# Patient Record
Sex: MALE | Race: BLACK OR AFRICAN AMERICAN | Employment: UNEMPLOYED | URBAN - METROPOLITAN AREA
[De-identification: names, ages, dates, MRNs, and addresses within clinical notes are randomized per-mention and may not be internally consistent; named-entity substitution may affect disease eponyms.]

---

## 2018-11-07 ENCOUNTER — HOSPITAL ENCOUNTER (EMERGENCY)
Age: 38
Discharge: HOME | End: 2018-11-07
Attending: EMERGENCY MEDICINE
Payer: SELF-PAY

## 2018-11-07 VITALS
RESPIRATION RATE: 16 BRPM | SYSTOLIC BLOOD PRESSURE: 179 MMHG | BODY MASS INDEX: 18.02 KG/M2 | WEIGHT: 136 LBS | HEIGHT: 73 IN | TEMPERATURE: 97.8 F | HEART RATE: 91 BPM | DIASTOLIC BLOOD PRESSURE: 121 MMHG | OXYGEN SATURATION: 98 %

## 2018-11-07 DIAGNOSIS — S61.212A LACERATION OF RIGHT MIDDLE FINGER WITHOUT FOREIGN BODY WITHOUT DAMAGE TO NAIL, INITIAL ENCOUNTER: Primary | ICD-10-CM

## 2018-11-07 DIAGNOSIS — I10 HYPERTENSION, ESSENTIAL: ICD-10-CM

## 2018-11-07 PROCEDURE — 90715 TDAP VACCINE 7 YRS/> IM: CPT | Performed by: PHYSICIAN ASSISTANT

## 2018-11-07 PROCEDURE — 90471 IMMUNIZATION ADMIN: CPT

## 2018-11-07 PROCEDURE — 74011250636 HC RX REV CODE- 250/636: Performed by: PHYSICIAN ASSISTANT

## 2018-11-07 PROCEDURE — 99282 EMERGENCY DEPT VISIT SF MDM: CPT

## 2018-11-07 RX ADMIN — TETANUS TOXOID, REDUCED DIPHTHERIA TOXOID AND ACELLULAR PERTUSSIS VACCINE, ADSORBED 0.5 ML: 5; 2.5; 8; 8; 2.5 SUSPENSION INTRAMUSCULAR at 14:31

## 2018-11-07 NOTE — LETTER
[facility] EMERGENCY DEPT 
[address] [place] [address] 
[phone] Work/School Note Date: 11/7/2018 To Whom It May concern: 
 
Rodolfo Patton was seen and treated today in the emergency room by the following provider(s): 
Attending Provider: Maria De Jesus Watt MD 
Physician Assistant: Camila Hamlin. Rodolfo Patton may return to work on 11/8/18. Sincerely, ZULEYKA Sellers

## 2018-11-07 NOTE — ED TRIAGE NOTES
Pt. States \"I think I need stitches; I slid it across a light fixture 5 days ago\" observed healing laceration to middle finger on right hand.

## 2018-11-07 NOTE — DISCHARGE INSTRUCTIONS
Cuts Left Open: Care Instructions  Your Care Instructions    A cut can happen anywhere on your body. Sometimes a cut can injure the tendons, blood vessels, or nerves. A cut may be left open instead of being closed with stitches, staples, or adhesive. A cut may be left open when it is likely to become infected, because closing it can make infection even more likely. You will probably have a bandage. The doctor may want the cut to stay open the whole time it heals. This happens with some cuts when too much time has gone by since the cut happened. Or the doctor may tell you to come back to have the cut closed in 4 to 5 days, when there is less chance of infection. If the cut stays open while healing, your scar may be larger than if the cut was closed. But you can get treatment later to make the scar smaller. The doctor has checked you carefully, but problems can develop later. If you notice any problems or new symptoms, get medical treatment right away. Follow-up care is a key part of your treatment and safety. Be sure to make and go to all appointments, and call your doctor if you are having problems. It's also a good idea to know your test results and keep a list of the medicines you take. How can you care for yourself at home? · Keep the cut dry for the first 24 to 48 hours. After this, you can shower if your doctor okays it. Pat the cut dry. · Don't soak the cut, such as in a bathtub. Your doctor will tell you when it's safe to get the cut wet. · If your doctor told you how to care for your cut, follow your doctor's instructions. If you did not get instructions, follow this general advice:  ? After the first 24 to 48 hours, wash the cut with clean water 2 times a day. Don't use hydrogen peroxide or alcohol, which can slow healing. ? You may cover the cut with a thin layer of petroleum jelly, such as Vaseline, and a nonstick bandage. ?  Apply more petroleum jelly and replace the bandage as needed. · Prop up the injured area on a pillow anytime you sit or lie down during the next 3 days. Try to keep it above the level of your heart. This will help reduce swelling. · Avoid any activity that could cause your cut to get worse. · Take pain medicines exactly as directed. ? If the doctor gave you a prescription medicine for pain, take it as prescribed. ? If you are not taking a prescription pain medicine, ask your doctor if you can take an over-the-counter medicine. When should you call for help? Call your doctor now or seek immediate medical care if:    · You have new pain, or your pain gets worse.     · The cut starts to bleed, and blood soaks through the bandage. Oozing small amounts of blood is normal.     · The skin near the cut is cold or pale or changes color.     · You have tingling, weakness, or numbness near the cut.     · You have trouble moving the area near the cut.     · You have symptoms of infection, such as:  ? Increased pain, swelling, warmth, or redness around the cut.  ? Red streaks leading from the cut.  ? Pus draining from the cut.  ? A fever.    Watch closely for changes in your health, and be sure to contact your doctor if:    · The cut is not closing (getting smaller).     · You do not get better as expected. Where can you learn more? Go to http://cecilia-bebeto.info/. Enter [id number] in the search box to learn more about \"Cuts Left Open: Care Instructions. \"  Current as of: November 20, 2017  Content Version: 11.8  © 2798-1671 [employer]. Care instructions adapted under license by [employer] (which disclaims liability or warranty for this information). If you have questions about a medical condition or this instruction, always ask your healthcare professional. [place] [address] any warranty or liability for your use of this information.

## 2018-11-07 NOTE — ED PROVIDER NOTES
EMERGENCY DEPARTMENT HISTORY AND PHYSICAL EXAM 
 
Date: 11/7/2018 Patient Name: Fran Hankins History of Presenting Illness Chief Complaint Patient presents with  Laceration  
  middle finger/right hand History Provided By: Patient Chief Complaint: laceration Duration: 5 Days Timing:  Improving Location: right 3rd finger Quality: Aching Severity: Mild Modifying Factors: none Associated Symptoms: denies any other associated signs or symptoms Additional History (Context): Fran Hankins is a 34 y.o. male with hypertension who presents with laceration right 3rd finger. Pt states he cut finger as he slid it along light fixture 5 days ago. Has kept it clean with peroxide and wrapped with gauze. Now believes he needs stitches. Needs tetatus shot. PCP: None Current Facility-Administered Medications Medication Dose Route Frequency Provider Last Rate Last Dose  diph,Pertuss(AC),Tet Vac-PF (BOOSTRIX) suspension 0.5 mL  0.5 mL IntraMUSCular ONCE ZULEYKA Rush Current Outpatient Medications Medication Sig Dispense Refill  spironolactone (ALDACTONE) 25 mg tablet Take 25 mg by mouth daily.  indapamide (LOZOL) 2.5 mg tablet Take 2.5 mg by mouth daily.  oxyCODONE-acetaminophen (PERCOCET) 5-325 mg per tablet Take 1-2 Tabs by mouth every six (6) hours as needed for Pain. Max Daily Amount: 8 Tabs. 20 Tab 0 Past History Past Medical History: 
Past Medical History:  
Diagnosis Date  Dental caries  HTN (hypertension) Past Surgical History: No past surgical history on file. Family History: No family history on file. Social History: 
Social History Tobacco Use  Smoking status: Current Every Day Smoker Packs/day: 0.25 Substance Use Topics  Alcohol use: No  
 Drug use: No  
 
 
Allergies: 
No Known Allergies Review of Systems Review of Systems Constitutional: Negative for chills and fever. Skin: Positive for wound. All other systems reviewed and are negative. All Other Systems Negative Physical Exam  
 
Vitals:  
 11/07/18 1429 BP: (!) 179/121 Pulse: 91  
Resp: 16 Temp: 97.8 °F (36.6 °C) SpO2: 98% Weight: 61.7 kg (136 lb) Height: 6' 1\" (1.854 m) Physical Exam  
Constitutional: He appears well-developed and well-nourished. No distress. HENT:  
Head: Normocephalic and atraumatic. Right Ear: External ear normal.  
Left Ear: External ear normal.  
Eyes: Conjunctivae are normal.  
Musculoskeletal:  
     Hands: 
Skin: He is not diaphoretic. Diagnostic Study Results Labs - No results found for this or any previous visit (from the past 12 hour(s)). Radiologic Studies - No orders to display CT Results  (Last 48 hours) None CXR Results  (Last 48 hours) None Medical Decision Making I am the first provider for this patient. I reviewed the vital signs, available nursing notes, past medical history, past surgical history, family history and social history. Vital Signs-Reviewed the patient's vital signs. Pulse Oximetry Analysis - 98% on RA Records Reviewed: Nursing Notes Procedures: 
Procedures Provider Notes (Medical Decision Making): pt c/o lac to right 5th finger occurred 5 days ago on a light fixture. Has been taking care of the cut himself at home with peroxide and dressing. No infection noted. Wound almost closed. Just some swelling. Will give tetanus. Encouraged to continue current wound care. MED RECONCILIATION: 
Current Facility-Administered Medications Medication Dose Route Frequency  diph,Pertuss(AC),Tet Vac-PF (BOOSTRIX) suspension 0.5 mL  0.5 mL IntraMUSCular ONCE Current Outpatient Medications Medication Sig  
 spironolactone (ALDACTONE) 25 mg tablet Take 25 mg by mouth daily.  indapamide (LOZOL) 2.5 mg tablet Take 2.5 mg by mouth daily.  oxyCODONE-acetaminophen (PERCOCET) 5-325 mg per tablet Take 1-2 Tabs by mouth every six (6) hours as needed for Pain. Max Daily Amount: 8 Tabs. Disposition: D/c to home DISCHARGE NOTE:  
 
Pt has been reexamined. Patient has no new complaints, changes, or physical findings. Care plan outlined and precautions discussed. All of pt's questions and concerns were addressed. Patient was instructed and agrees to follow up with pcp, as well as to return to the ED upon further deterioration. Patient is ready to go home. Follow-up Information None Current Discharge Medication List  
  
 
 
Diagnosis Clinical Impression: 1. Laceration of right middle finger without foreign body without damage to nail, initial encounter 2.  Hypertension, essential

## 2020-01-09 ENCOUNTER — HOSPITAL ENCOUNTER (EMERGENCY)
Age: 40
Discharge: HOME | End: 2020-01-09
Attending: EMERGENCY MEDICINE
Payer: SELF-PAY

## 2020-01-09 VITALS
DIASTOLIC BLOOD PRESSURE: 127 MMHG | TEMPERATURE: 98.2 F | BODY MASS INDEX: 17.49 KG/M2 | SYSTOLIC BLOOD PRESSURE: 205 MMHG | HEART RATE: 82 BPM | RESPIRATION RATE: 14 BRPM | WEIGHT: 132 LBS | HEIGHT: 73 IN | OXYGEN SATURATION: 98 %

## 2020-01-09 DIAGNOSIS — J11.1 INFLUENZA: Primary | ICD-10-CM

## 2020-01-09 PROCEDURE — 99282 EMERGENCY DEPT VISIT SF MDM: CPT

## 2020-01-09 RX ORDER — OSELTAMIVIR PHOSPHATE 75 MG/1
75 CAPSULE ORAL 2 TIMES DAILY
Qty: 10 CAP | Refills: 0 | Status: SHIPPED | OUTPATIENT
Start: 2020-01-09 | End: 2020-01-14

## 2020-01-09 RX ORDER — OSELTAMIVIR PHOSPHATE 75 MG/1
75 CAPSULE ORAL 2 TIMES DAILY
Qty: 10 CAP | Refills: 0 | Status: SHIPPED | OUTPATIENT
Start: 2020-01-09 | End: 2020-01-09

## 2020-01-09 NOTE — ED TRIAGE NOTES
\"My head hurts, I feel stuffy, and I'm hurting all over. It started yesterday. \"  He ran out of his Clonidine approximately 3 weeks ago. His BP in triage was 205/127.

## 2020-01-09 NOTE — ED PROVIDER NOTES
EMERGENCY DEPARTMENT HISTORY AND PHYSICAL EXAM    10:18 AM      Date: 1/9/2020  Patient Name: Evan Christina    History of Presenting Illness     Chief Complaint   Patient presents with    Generalized Body Aches       History Provided By: Patient    Chief Complaint: Rhinorrhea, congestion, body aches, chills, cough  Duration: 2 Days  Timing:  Acute  Location:   Quality: Aching  Severity: 7 out of 10  Modifying Factors: none  Associated Symptoms: denies any other associated signs or symptoms      Additional History (Context):Karan Jeter is a 37 y.o. male with a pertinent history of HTN who presents to the emergency department for evaluation of rhinorrhea, congestion, body aches, chills, cough x2 days. No known ill contacts. Patient states he thinks he has the flu and needs a work note. No associated nausea, vomiting, diarrhea, abdominal pain, urinary symptoms, or any other complaints. No treatments prior to arrival.      PCP:  None      Current Outpatient Medications   Medication Sig Dispense Refill    oseltamivir (TAMIFLU) 75 mg capsule Take 1 Cap by mouth two (2) times a day for 5 days. 10 Cap 0    spironolactone (ALDACTONE) 25 mg tablet Take 25 mg by mouth daily.  indapamide (LOZOL) 2.5 mg tablet Take 2.5 mg by mouth daily.  oxyCODONE-acetaminophen (PERCOCET) 5-325 mg per tablet Take 1-2 Tabs by mouth every six (6) hours as needed for Pain. Max Daily Amount: 8 Tabs. 20 Tab 0       Past History     Past Medical History:  Past Medical History:   Diagnosis Date    Dental caries     HTN (hypertension)        Past Surgical History:  History reviewed. No pertinent surgical history. Family History:  History reviewed. No pertinent family history.     Social History:  Social History     Tobacco Use    Smoking status: Current Every Day Smoker     Packs/day: 0.25    Smokeless tobacco: Never Used   Substance Use Topics    Alcohol use: No    Drug use: No       Allergies:  No Known Allergies      Review of Systems       Review of Systems   Constitutional: Positive for chills. Negative for fever. HENT: Positive for congestion and rhinorrhea. Negative for sore throat. Respiratory: Positive for cough. Negative for shortness of breath. Cardiovascular: Negative for chest pain. Gastrointestinal: Negative for abdominal pain, blood in stool, constipation, diarrhea, nausea and vomiting. Genitourinary: Negative for dysuria, frequency and hematuria. Musculoskeletal: Positive for myalgias. Negative for back pain. Skin: Negative for rash and wound. Neurological: Negative for dizziness and headaches. All other systems reviewed and are negative. Physical Exam     Visit Vitals  BP (!) 205/127 (BP 1 Location: Left arm, BP Patient Position: At rest)   Pulse 82   Temp 98.2 °F (36.8 °C)   Resp 14   Ht 6' 1\" (1.854 m)   Wt 59.9 kg (132 lb)   SpO2 98%   BMI 17.42 kg/m²       Physical Exam  Vitals signs and nursing note reviewed. Constitutional:       General: He is not in acute distress. Appearance: He is well-developed. He is not diaphoretic. HENT:      Head: Normocephalic and atraumatic. Nose: Congestion and rhinorrhea present. Comments: Erythematous bilateral nasal turbinates with clear rhinorrhea     Mouth/Throat:      Pharynx: Posterior oropharyngeal erythema present. No oropharyngeal exudate. Comments: Erythematous posterior oropharynx without exudates, edema, or lesions  Eyes:      Conjunctiva/sclera: Conjunctivae normal.   Neck:      Musculoskeletal: Normal range of motion and neck supple. Cardiovascular:      Rate and Rhythm: Normal rate and regular rhythm. Heart sounds: Normal heart sounds. Pulmonary:      Effort: Pulmonary effort is normal. No respiratory distress. Breath sounds: Normal breath sounds. No stridor. No wheezing, rhonchi or rales. Comments: Lungs are clear to auscultation bilaterally  Chest:      Chest wall: No tenderness. Musculoskeletal: Normal range of motion. General: No deformity. Skin:     General: Skin is warm and dry. Neurological:      Mental Status: He is alert and oriented to person, place, and time. Diagnostic Study Results     Labs -  No results found for this or any previous visit (from the past 12 hour(s)). Radiologic Studies -   No results found. Medical Decision Making   I am the first provider for this patient. I reviewed the vital signs, available nursing notes, past medical history, past surgical history, family history and social history. Vital Signs-Reviewed the patient's vital signs. Pulse Oximetry Analysis -  98% on room air (Interpretation)    Records Reviewed: Nursing Notes (Time of Review: 10:18 AM)    ED Course: Progress Notes, Reevaluation, and Consults:    Provider Notes (Medical Decision Making):     differential diagnosis: Viral URI, sinusitis, allergic rhinitis, unlikely pneumonia, influenza    Plan: Patient presents ambulatory in no acute distress elevated blood pressure and otherwise normal vitals. Patient is asymptomatic and states he is taking his medications. Exam and HPI most consistent with influenza. Will treat and discharge home with work note per patient request advised to follow-up with his primary care doctor. At this time, patient is stable and appropriate for discharge home. Patient demonstrates understanding of current diagnoses and is in agreement with the treatment plan. They are advised that while the likelihood of serious underlying condition is low at this point given the evaluation performed today, we cannot fully rule it out. They are advised to immediately return with any new symptoms or worsening of current condition. All questions have been answered. Patient is given educational material regarding their diagnoses, including danger symptoms and when to return to the ED. Diagnosis     Clinical Impression:   1.  Influenza Disposition: DC Home    Follow-up Information     Follow up With Specialties Details Why Contact Info    [address]  Call in 2 days  [facility]    [facility] EMERGENCY DEPT Emergency Medicine Go to As needed, If symptoms worsen [address]  [phone]           Patient's Medications   Start Taking    OSELTAMIVIR (TAMIFLU) 75 MG CAPSULE    Take 1 Cap by mouth two (2) times a day for 5 days. Continue Taking    INDAPAMIDE (LOZOL) 2.5 MG TABLET    Take 2.5 mg by mouth daily. OXYCODONE-ACETAMINOPHEN (PERCOCET) 5-325 MG PER TABLET    Take 1-2 Tabs by mouth every six (6) hours as needed for Pain. Max Daily Amount: 8 Tabs. SPIRONOLACTONE (ALDACTONE) 25 MG TABLET    Take 25 mg by mouth daily. These Medications have changed    No medications on file   Stop Taking    No medications on file     _______________________________    This note was dictated utilizing voice recognition software which may lead to typographical errors. I apologize in advance if the situation occurs. If questions arise please do not hesitate to contact me or call our department.   Jacqueline Kitchen PA-C

## 2020-01-09 NOTE — LETTER
NOTIFICATION OF RETURN TO WORK 
 
1/9/2020 10:15 AM 
 
Mr. Claudean Peaches [address] [facility] [address] [place] Valladares To Whom It May Concern: 
 
Claudean Peaches was under the care of [facility] EMERGENCY DEPT . He will be able to return to work on Monday, 1/13/20. If there are questions or concerns please have the patient contact our office. Sincerely, Marylee Loges, PA-C

## 2022-04-06 ENCOUNTER — HOSPITAL ENCOUNTER (EMERGENCY)
Dept: CT IMAGING | Age: 42
Discharge: HOME | End: 2022-04-06
Attending: PHYSICIAN ASSISTANT
Payer: MEDICAID

## 2022-04-06 ENCOUNTER — HOSPITAL ENCOUNTER (EMERGENCY)
Age: 42
Discharge: HOME | End: 2022-04-06
Attending: STUDENT IN AN ORGANIZED HEALTH CARE EDUCATION/TRAINING PROGRAM
Payer: MEDICAID

## 2022-04-06 VITALS
BODY MASS INDEX: 18.42 KG/M2 | TEMPERATURE: 98.7 F | RESPIRATION RATE: 16 BRPM | HEIGHT: 72 IN | HEART RATE: 97 BPM | DIASTOLIC BLOOD PRESSURE: 132 MMHG | WEIGHT: 136 LBS | OXYGEN SATURATION: 97 % | SYSTOLIC BLOOD PRESSURE: 191 MMHG

## 2022-04-06 DIAGNOSIS — R03.0 ELEVATED BLOOD PRESSURE READING: ICD-10-CM

## 2022-04-06 DIAGNOSIS — S39.012A BACK STRAIN, INITIAL ENCOUNTER: ICD-10-CM

## 2022-04-06 DIAGNOSIS — R10.84 ABDOMINAL PAIN, GENERALIZED: ICD-10-CM

## 2022-04-06 DIAGNOSIS — S09.90XA CLOSED HEAD INJURY, INITIAL ENCOUNTER: ICD-10-CM

## 2022-04-06 DIAGNOSIS — E87.6 HYPOKALEMIA: ICD-10-CM
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
## 2022-04-06 LAB
ALBUMIN SERPL-MCNC: 3.4 G/DL (ref 3.4–5)
ALBUMIN/GLOB SERPL: 0.9 {RATIO} (ref 0.8–1.7)
ALP SERPL-CCNC: 58 U/L (ref 45–117)
ALT SERPL-CCNC: 28 U/L (ref 16–61)
ANION GAP SERPL CALC-SCNC: 2 MMOL/L (ref 3–18)
AST SERPL-CCNC: 41 U/L (ref 10–38)
BASOPHILS # BLD: 0 K/UL (ref 0–0.1)
BASOPHILS NFR BLD: 1 % (ref 0–2)
BILIRUB DIRECT SERPL-MCNC: 0.1 MG/DL (ref 0–0.2)
BILIRUB SERPL-MCNC: 0.4 MG/DL (ref 0.2–1)
BUN SERPL-MCNC: 21 MG/DL (ref 7–18)
BUN/CREAT SERPL: 18 (ref 12–20)
CALCIUM SERPL-MCNC: 8.8 MG/DL (ref 8.5–10.1)
CHLORIDE SERPL-SCNC: 107 MMOL/L (ref 100–111)
CO2 SERPL-SCNC: 32 MMOL/L (ref 21–32)
CREAT SERPL-MCNC: 1.14 MG/DL (ref 0.6–1.3)
DIFFERENTIAL METHOD BLD: ABNORMAL
EOSINOPHIL # BLD: 0.2 K/UL (ref 0–0.4)
EOSINOPHIL NFR BLD: 3 % (ref 0–5)
ERYTHROCYTE [DISTWIDTH] IN BLOOD BY AUTOMATED COUNT: 13.6 % (ref 11.6–14.5)
GLOBULIN SER CALC-MCNC: 3.6 G/DL (ref 2–4)
GLUCOSE SERPL-MCNC: 86 MG/DL (ref 74–99)
HCT VFR BLD AUTO: 37.6 % (ref 36–48)
HGB BLD-MCNC: 13 G/DL (ref 13–16)
IMM GRANULOCYTES # BLD AUTO: 0 K/UL (ref 0–0.04)
IMM GRANULOCYTES NFR BLD AUTO: 0 % (ref 0–0.5)
LYMPHOCYTES # BLD: 2.3 K/UL (ref 0.9–3.6)
LYMPHOCYTES NFR BLD: 31 % (ref 21–52)
MCH RBC QN AUTO: 33.9 PG (ref 24–34)
MCHC RBC AUTO-ENTMCNC: 34.6 G/DL (ref 31–37)
MCV RBC AUTO: 97.9 FL (ref 78–100)
MONOCYTES # BLD: 0.6 K/UL (ref 0.05–1.2)
MONOCYTES NFR BLD: 8 % (ref 3–10)
NEUTS SEG # BLD: 4.1 K/UL (ref 1.8–8)
NEUTS SEG NFR BLD: 58 % (ref 40–73)
NRBC # BLD: 0 K/UL (ref 0–0.01)
NRBC BLD-RTO: 0 PER 100 WBC
PLATELET # BLD AUTO: 163 K/UL (ref 135–420)
PMV BLD AUTO: 9.9 FL (ref 9.2–11.8)
POTASSIUM SERPL-SCNC: 3.4 MMOL/L (ref 3.5–5.5)
PROT SERPL-MCNC: 7 G/DL (ref 6.4–8.2)
RBC # BLD AUTO: 3.84 M/UL (ref 4.35–5.65)
SODIUM SERPL-SCNC: 141 MMOL/L (ref 136–145)
WBC # BLD AUTO: 7.2 K/UL (ref 4.6–13.2)

## 2022-04-06 PROCEDURE — 99285 EMERGENCY DEPT VISIT HI MDM: CPT

## 2022-04-06 PROCEDURE — 80048 BASIC METABOLIC PNL TOTAL CA: CPT

## 2022-04-06 PROCEDURE — 71260 CT THORAX DX C+: CPT

## 2022-04-06 PROCEDURE — 80076 HEPATIC FUNCTION PANEL: CPT

## 2022-04-06 PROCEDURE — 96374 THER/PROPH/DIAG INJ IV PUSH: CPT

## 2022-04-06 PROCEDURE — 74011250636 HC RX REV CODE- 250/636: Performed by: PHYSICIAN ASSISTANT

## 2022-04-06 PROCEDURE — 74011250637 HC RX REV CODE- 250/637: Performed by: PHYSICIAN ASSISTANT

## 2022-04-06 PROCEDURE — 70450 CT HEAD/BRAIN W/O DYE: CPT

## 2022-04-06 PROCEDURE — 85025 COMPLETE CBC W/AUTO DIFF WBC: CPT

## 2022-04-06 PROCEDURE — 72125 CT NECK SPINE W/O DYE: CPT

## 2022-04-06 PROCEDURE — 74011000636 HC RX REV CODE- 636: Performed by: PHYSICIAN ASSISTANT

## 2022-04-06 RX ORDER — POTASSIUM CHLORIDE 20 MEQ/1
20 TABLET, EXTENDED RELEASE ORAL 3 TIMES DAILY
Qty: 9 TABLET | Refills: 0 | Status: SHIPPED | OUTPATIENT
Start: 2022-04-06 | End: 2022-04-09

## 2022-04-06 RX ORDER — KETOROLAC TROMETHAMINE 10 MG/1
10 TABLET, FILM COATED ORAL
Qty: 20 TABLET | Refills: 0 | Status: SHIPPED | OUTPATIENT
Start: 2022-04-06

## 2022-04-06 RX ORDER — CLONIDINE HYDROCHLORIDE 0.1 MG/1
0.1 TABLET ORAL DAILY
Qty: 30 TABLET | Refills: 0 | Status: SHIPPED | OUTPATIENT
Start: 2022-04-06 | End: 2022-05-06

## 2022-04-06 RX ORDER — DIAZEPAM 5 MG/1
5 TABLET ORAL
Qty: 15 TABLET | Refills: 0 | Status: SHIPPED | OUTPATIENT
Start: 2022-04-06

## 2022-04-06 RX ORDER — MORPHINE SULFATE 4 MG/ML
4 INJECTION INTRAVENOUS
Status: COMPLETED | OUTPATIENT
Start: 2022-04-06 | End: 2022-04-06

## 2022-04-06 RX ORDER — CLONIDINE HYDROCHLORIDE 0.1 MG/1
0.1 TABLET ORAL
Status: COMPLETED | OUTPATIENT
Start: 2022-04-06 | End: 2022-04-06

## 2022-04-06 RX ORDER — ACETAMINOPHEN 325 MG/1
650 TABLET ORAL
Qty: 20 TABLET | Refills: 0 | Status: SHIPPED | OUTPATIENT
Start: 2022-04-06

## 2022-04-06 RX ADMIN — IOPAMIDOL 100 ML: 612 INJECTION, SOLUTION INTRAVENOUS at 18:04

## 2022-04-06 RX ADMIN — MORPHINE SULFATE 4 MG: 4 INJECTION, SOLUTION INTRAMUSCULAR; INTRAVENOUS at 15:50

## 2022-04-06 RX ADMIN — CLONIDINE HYDROCHLORIDE 0.1 MG: 0.1 TABLET ORAL at 20:03

## 2022-04-06 NOTE — ED NOTES
6:57 PM :Pt care assumed from Veena Valdez, ED provider. Pt complaint(s), current treatment plan, progression and available diagnostic results have been discussed thoroughly. Rounding occurred: no  Intended Disposition: Home   Pending diagnostic reports and/or labs (please list): CT results x 3    7:17 PM  IMPRESSION  Chest:  -No acute findings. Abdomen/pelvis:  -No acute traumatic findings.  -Slightly prominent biliary tree without limits of normal common body and  caliber. Recommend correlation with biliary enzymes and further investigation if warranted.  -Enlarged polycystic kidneys. Multiple hepatic cysts as well. IMPRESSION  No acute intracranial findings. Left frontal scalp indentation. Query soft tissue injury. IMPRESSION  No acute fracture or focal malalignment. Degenerative changes summarized above. Added on LFTs based on CT abd/pelvis findings    8:00 PM: LFTs essentially unremarkable, except AST minimally elevated at 41. Pt ambulatory without assistance. Abdomen soft and non-tender to palpation. Reviewed results and plan with patient. Discussed need for close outpatient follow-up this week for reassessment. Discussed strict return precautions, including abdominal pain, vomiting, or any other medical concerns. Pt in agreement with plan, ready to go home. One or more blood pressure readings were noted elevated during the patient's presentation in the emergency department today.   This abnormal reading has been cited in the patients' diagnosis, and they have been encouraged to follow up with their primary care physician, or referred to a consultant for further evaluation and treatment.

## 2022-04-06 NOTE — ED NOTES
Pt states he wants to leave    States he has his ride coming    Patient was advised that all of his labs have not been completed      Pt asked for his IV to be removed    PIV removed    Pt is leaving AMA.
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
## 2022-04-06 NOTE — Clinical Note
[address] [place] [address] Dr MONICA AMA[facility] EMERGENCY DEPT  [address] [address] [phone]    Work/School Note    Date: 4/6/2022    To Whom It May concern:    Edwina Schmid was seen and treated today in the emergency room by the following provider(s):  Attending Provider: Jennifer Tarango MD  Physician Assistant: Mortimer Cash, PA. Edwina Schmid is excused from work/school on 4/6/2022 through 4/8/2022. He is medically clear to return to work/school on 4/9/2022.          Sincerely,          ZULEYKA Villa

## 2022-04-06 NOTE — Clinical Note
[address] [place] [address] Dr [facility] EMERGENCY DEPT  [address] [address] [phone]    Work/School Note    Date: 4/6/2022    To Whom It May concern:    Hennie Cooks was seen and treated today in the emergency room by the following provider(s):  Attending Provider: Slade Jamison MD  Physician Assistant: ZULEYKA Wolfe. Hennie Cooks is excused from work/school on 4/6/2022 through 4/8/2022. He is medically clear to return to work/school on 4/9/2022.          Sincerely,          ZULEYKA Sutton

## 2024-05-12 ENCOUNTER — APPOINTMENT (OUTPATIENT)
Facility: HOSPITAL | Age: 44
End: 2024-05-12
Payer: MEDICAID

## 2024-05-12 ENCOUNTER — HOSPITAL ENCOUNTER (INPATIENT)
Facility: HOSPITAL | Age: 44
LOS: 5 days | Discharge: HOME | End: 2024-05-17
Attending: STUDENT IN AN ORGANIZED HEALTH CARE EDUCATION/TRAINING PROGRAM | Admitting: INTERNAL MEDICINE
Payer: MEDICAID

## 2024-05-12 DIAGNOSIS — R56.9 SEIZURE (HCC): ICD-10-CM

## 2024-05-12 DIAGNOSIS — I16.1 HYPERTENSIVE EMERGENCY: ICD-10-CM

## 2024-05-12 DIAGNOSIS — I10 HYPERTENSION, UNSPECIFIED TYPE: ICD-10-CM

## 2024-05-12 DIAGNOSIS — F19.10 POLYSUBSTANCE ABUSE (HCC): Primary | ICD-10-CM

## 2024-05-12 LAB
ALBUMIN SERPL-MCNC: 4 G/DL (ref 3.4–5)
ALBUMIN/GLOB SERPL: 0.9 (ref 0.8–1.7)
ALP SERPL-CCNC: 69 U/L (ref 45–117)
ALT SERPL-CCNC: 22 U/L (ref 16–61)
AMPHET UR QL SCN: NEGATIVE
ANION GAP SERPL CALC-SCNC: 16 MMOL/L (ref 3–18)
ANION GAP SERPL CALC-SCNC: 17 MMOL/L (ref 3–18)
ANION GAP SERPL CALC-SCNC: 7 MMOL/L (ref 3–18)
ARTERIAL PATENCY WRIST A: POSITIVE
AST SERPL-CCNC: 21 U/L (ref 10–38)
BARBITURATES UR QL SCN: NEGATIVE
BASE EXCESS BLD CALC-SCNC: 1.8 MMOL/L
BASOPHILS # BLD: 0.1 K/UL (ref 0–0.1)
BASOPHILS NFR BLD: 1 % (ref 0–2)
BDY SITE: ABNORMAL
BENZODIAZ UR QL: NEGATIVE
BILIRUB SERPL-MCNC: 0.6 MG/DL (ref 0.2–1)
BUN SERPL-MCNC: 22 MG/DL (ref 7–18)
BUN SERPL-MCNC: 25 MG/DL (ref 7–18)
BUN SERPL-MCNC: 27 MG/DL (ref 7–18)
BUN/CREAT SERPL: 11 (ref 12–20)
BUN/CREAT SERPL: 11 (ref 12–20)
BUN/CREAT SERPL: 12 (ref 12–20)
CALCIUM SERPL-MCNC: 8.9 MG/DL (ref 8.5–10.1)
CALCIUM SERPL-MCNC: 9.1 MG/DL (ref 8.5–10.1)
CALCIUM SERPL-MCNC: 9.5 MG/DL (ref 8.5–10.1)
CANNABINOIDS UR QL SCN: NEGATIVE
CHLORIDE SERPL-SCNC: 100 MMOL/L (ref 100–111)
CHLORIDE SERPL-SCNC: 102 MMOL/L (ref 100–111)
CHLORIDE SERPL-SCNC: 99 MMOL/L (ref 100–111)
CO2 SERPL-SCNC: 17 MMOL/L (ref 21–32)
CO2 SERPL-SCNC: 17 MMOL/L (ref 21–32)
CO2 SERPL-SCNC: 26 MMOL/L (ref 21–32)
COCAINE UR QL SCN: POSITIVE
CREAT SERPL-MCNC: 1.88 MG/DL (ref 0.6–1.3)
CREAT SERPL-MCNC: 2.3 MG/DL (ref 0.6–1.3)
CREAT SERPL-MCNC: 2.49 MG/DL (ref 0.6–1.3)
DIFFERENTIAL METHOD BLD: ABNORMAL
EKG ATRIAL RATE: 118 BPM
EKG DIAGNOSIS: NORMAL
EKG P AXIS: 81 DEGREES
EKG P-R INTERVAL: 122 MS
EKG Q-T INTERVAL: 350 MS
EKG QRS DURATION: 92 MS
EKG QTC CALCULATION (BAZETT): 490 MS
EKG R AXIS: 23 DEGREES
EKG T AXIS: 62 DEGREES
EKG VENTRICULAR RATE: 118 BPM
EOSINOPHIL # BLD: 0 K/UL (ref 0–0.4)
EOSINOPHIL NFR BLD: 0 % (ref 0–5)
ERYTHROCYTE [DISTWIDTH] IN BLOOD BY AUTOMATED COUNT: 13.1 % (ref 11.6–14.5)
EST. AVERAGE GLUCOSE BLD GHB EST-MCNC: 100 MG/DL
GAS FLOW.O2 O2 DELIVERY SYS: ABNORMAL
GLOBULIN SER CALC-MCNC: 4.7 G/DL (ref 2–4)
GLUCOSE BLD STRIP.AUTO-MCNC: 111 MG/DL (ref 70–110)
GLUCOSE SERPL-MCNC: 111 MG/DL (ref 74–99)
GLUCOSE SERPL-MCNC: 119 MG/DL (ref 74–99)
GLUCOSE SERPL-MCNC: 141 MG/DL (ref 74–99)
HBA1C MFR BLD: 5.1 % (ref 4.2–5.6)
HCO3 BLD-SCNC: 25.4 MMOL/L (ref 22–26)
HCT VFR BLD AUTO: 48.1 % (ref 36–48)
HGB BLD-MCNC: 15.6 G/DL (ref 13–16)
IMM GRANULOCYTES # BLD AUTO: 0.1 K/UL (ref 0–0.04)
IMM GRANULOCYTES NFR BLD AUTO: 1 % (ref 0–0.5)
LACTATE SERPL-SCNC: 14.4 MMOL/L (ref 0.4–2)
LACTATE SERPL-SCNC: 2.2 MMOL/L (ref 0.4–2)
LACTATE SERPL-SCNC: 4.6 MMOL/L (ref 0.4–2)
LYMPHOCYTES # BLD: 1.3 K/UL (ref 0.9–3.6)
LYMPHOCYTES NFR BLD: 14 % (ref 21–52)
Lab: ABNORMAL
MCH RBC QN AUTO: 33.1 PG (ref 24–34)
MCHC RBC AUTO-ENTMCNC: 32.4 G/DL (ref 31–37)
MCV RBC AUTO: 102.1 FL (ref 78–100)
METHADONE UR QL: NEGATIVE
MONOCYTES # BLD: 0.5 K/UL (ref 0.05–1.2)
MONOCYTES NFR BLD: 5 % (ref 3–10)
NEUTS SEG # BLD: 7.5 K/UL (ref 1.8–8)
NEUTS SEG NFR BLD: 80 % (ref 40–73)
NRBC # BLD: 0 K/UL (ref 0–0.01)
NRBC BLD-RTO: 0 PER 100 WBC
OPIATES UR QL: POSITIVE
PCO2 BLD: 35.9 MMHG (ref 35–45)
PCP UR QL: NEGATIVE
PH BLD: 7.46 (ref 7.35–7.45)
PLATELET # BLD AUTO: 230 K/UL (ref 135–420)
PMV BLD AUTO: 9.6 FL (ref 9.2–11.8)
PO2 BLD: 80 MMHG (ref 80–100)
POTASSIUM SERPL-SCNC: 3.2 MMOL/L (ref 3.5–5.5)
POTASSIUM SERPL-SCNC: 3.8 MMOL/L (ref 3.5–5.5)
POTASSIUM SERPL-SCNC: ABNORMAL MMOL/L (ref 3.5–5.5)
PROCALCITONIN SERPL-MCNC: 0.09 NG/ML
PROT SERPL-MCNC: 8.7 G/DL (ref 6.4–8.2)
RBC # BLD AUTO: 4.71 M/UL (ref 4.35–5.65)
SAO2 % BLD: 96.4 % (ref 92–97)
SERVICE CMNT-IMP: ABNORMAL
SODIUM SERPL-SCNC: 132 MMOL/L (ref 136–145)
SODIUM SERPL-SCNC: 134 MMOL/L (ref 136–145)
SODIUM SERPL-SCNC: 135 MMOL/L (ref 136–145)
SPECIMEN TYPE: ABNORMAL
TROPONIN I SERPL HS-MCNC: 129 NG/L (ref 0–78)
TROPONIN I SERPL HS-MCNC: 72 NG/L (ref 0–78)
TROPONIN I SERPL HS-MCNC: 83 NG/L (ref 0–78)
WBC # BLD AUTO: 9.4 K/UL (ref 4.6–13.2)

## 2024-05-12 PROCEDURE — 94761 N-INVAS EAR/PLS OXIMETRY MLT: CPT

## 2024-05-12 PROCEDURE — 6360000002 HC RX W HCPCS: Performed by: STUDENT IN AN ORGANIZED HEALTH CARE EDUCATION/TRAINING PROGRAM

## 2024-05-12 PROCEDURE — 6360000002 HC RX W HCPCS: Performed by: PHYSICIAN ASSISTANT

## 2024-05-12 PROCEDURE — 80307 DRUG TEST PRSMV CHEM ANLYZR: CPT

## 2024-05-12 PROCEDURE — 85025 COMPLETE CBC W/AUTO DIFF WBC: CPT

## 2024-05-12 PROCEDURE — 83605 ASSAY OF LACTIC ACID: CPT

## 2024-05-12 PROCEDURE — 2580000003 HC RX 258: Performed by: STUDENT IN AN ORGANIZED HEALTH CARE EDUCATION/TRAINING PROGRAM

## 2024-05-12 PROCEDURE — 93005 ELECTROCARDIOGRAM TRACING: CPT | Performed by: STUDENT IN AN ORGANIZED HEALTH CARE EDUCATION/TRAINING PROGRAM

## 2024-05-12 PROCEDURE — 6370000000 HC RX 637 (ALT 250 FOR IP): Performed by: STUDENT IN AN ORGANIZED HEALTH CARE EDUCATION/TRAINING PROGRAM

## 2024-05-12 PROCEDURE — 84145 PROCALCITONIN (PCT): CPT

## 2024-05-12 PROCEDURE — 2720000010 HC SURG SUPPLY STERILE

## 2024-05-12 PROCEDURE — 96374 THER/PROPH/DIAG INJ IV PUSH: CPT

## 2024-05-12 PROCEDURE — 80048 BASIC METABOLIC PNL TOTAL CA: CPT

## 2024-05-12 PROCEDURE — 82962 GLUCOSE BLOOD TEST: CPT

## 2024-05-12 PROCEDURE — 71045 X-RAY EXAM CHEST 1 VIEW: CPT

## 2024-05-12 PROCEDURE — 2500000003 HC RX 250 WO HCPCS

## 2024-05-12 PROCEDURE — 93010 ELECTROCARDIOGRAM REPORT: CPT | Performed by: INTERNAL MEDICINE

## 2024-05-12 PROCEDURE — 2500000003 HC RX 250 WO HCPCS: Performed by: PHYSICIAN ASSISTANT

## 2024-05-12 PROCEDURE — 96361 HYDRATE IV INFUSION ADD-ON: CPT

## 2024-05-12 PROCEDURE — 99285 EMERGENCY DEPT VISIT HI MDM: CPT

## 2024-05-12 PROCEDURE — 2000000000 HC ICU R&B

## 2024-05-12 PROCEDURE — 70450 CT HEAD/BRAIN W/O DYE: CPT

## 2024-05-12 PROCEDURE — 95813 EEG EXTND MNTR 61-119 MIN: CPT

## 2024-05-12 PROCEDURE — 83036 HEMOGLOBIN GLYCOSYLATED A1C: CPT

## 2024-05-12 PROCEDURE — 99291 CRITICAL CARE FIRST HOUR: CPT | Performed by: PHYSICIAN ASSISTANT

## 2024-05-12 PROCEDURE — 96375 TX/PRO/DX INJ NEW DRUG ADDON: CPT

## 2024-05-12 PROCEDURE — 84484 ASSAY OF TROPONIN QUANT: CPT

## 2024-05-12 PROCEDURE — 82803 BLOOD GASES ANY COMBINATION: CPT

## 2024-05-12 PROCEDURE — 80053 COMPREHEN METABOLIC PANEL: CPT

## 2024-05-12 PROCEDURE — 36600 WITHDRAWAL OF ARTERIAL BLOOD: CPT

## 2024-05-12 RX ORDER — LEVETIRACETAM 500 MG/5ML
500 INJECTION, SOLUTION, CONCENTRATE INTRAVENOUS EVERY 12 HOURS
Status: DISCONTINUED | OUTPATIENT
Start: 2024-05-13 | End: 2024-05-17

## 2024-05-12 RX ORDER — 0.9 % SODIUM CHLORIDE 0.9 %
1000 INTRAVENOUS SOLUTION INTRAVENOUS ONCE
Status: COMPLETED | OUTPATIENT
Start: 2024-05-12 | End: 2024-05-12

## 2024-05-12 RX ORDER — INSULIN LISPRO 100 [IU]/ML
0-4 INJECTION, SOLUTION INTRAVENOUS; SUBCUTANEOUS
Status: DISCONTINUED | OUTPATIENT
Start: 2024-05-13 | End: 2024-05-17 | Stop reason: HOSPADM

## 2024-05-12 RX ORDER — NICARDIPINE HYDROCHLORIDE 0.1 MG/ML
2.5-15 INJECTION INTRAVENOUS CONTINUOUS
Status: DISCONTINUED | OUTPATIENT
Start: 2024-05-12 | End: 2024-05-12

## 2024-05-12 RX ORDER — LORAZEPAM 2 MG/ML
4 INJECTION INTRAMUSCULAR ONCE
Status: DISCONTINUED | OUTPATIENT
Start: 2024-05-12 | End: 2024-05-12

## 2024-05-12 RX ORDER — SODIUM CHLORIDE, SODIUM LACTATE, POTASSIUM CHLORIDE, AND CALCIUM CHLORIDE .6; .31; .03; .02 G/100ML; G/100ML; G/100ML; G/100ML
1000 INJECTION, SOLUTION INTRAVENOUS ONCE
Status: COMPLETED | OUTPATIENT
Start: 2024-05-12 | End: 2024-05-12

## 2024-05-12 RX ORDER — DEXTROSE MONOHYDRATE 100 MG/ML
INJECTION, SOLUTION INTRAVENOUS CONTINUOUS PRN
Status: DISCONTINUED | OUTPATIENT
Start: 2024-05-12 | End: 2024-05-17 | Stop reason: HOSPADM

## 2024-05-12 RX ORDER — LORAZEPAM 2 MG/ML
INJECTION INTRAMUSCULAR
Status: DISCONTINUED
Start: 2024-05-12 | End: 2024-05-12

## 2024-05-12 RX ORDER — HEPARIN SODIUM 5000 [USP'U]/ML
5000 INJECTION, SOLUTION INTRAVENOUS; SUBCUTANEOUS EVERY 8 HOURS SCHEDULED
Status: DISCONTINUED | OUTPATIENT
Start: 2024-05-12 | End: 2024-05-13

## 2024-05-12 RX ORDER — DEXMEDETOMIDINE HYDROCHLORIDE 4 UG/ML
.1-1.5 INJECTION, SOLUTION INTRAVENOUS CONTINUOUS
Status: DISCONTINUED | OUTPATIENT
Start: 2024-05-12 | End: 2024-05-13

## 2024-05-12 RX ORDER — NICARDIPINE HYDROCHLORIDE 0.1 MG/ML
2.5-15 INJECTION INTRAVENOUS CONTINUOUS
Status: DISCONTINUED | OUTPATIENT
Start: 2024-05-12 | End: 2024-05-13

## 2024-05-12 RX ORDER — AMLODIPINE BESYLATE 10 MG/1
10 TABLET ORAL DAILY
Status: DISCONTINUED | OUTPATIENT
Start: 2024-05-12 | End: 2024-05-17 | Stop reason: HOSPADM

## 2024-05-12 RX ORDER — POTASSIUM CHLORIDE 7.45 MG/ML
10 INJECTION INTRAVENOUS
Status: COMPLETED | OUTPATIENT
Start: 2024-05-13 | End: 2024-05-13

## 2024-05-12 RX ORDER — LEVETIRACETAM 500 MG/5ML
1500 INJECTION, SOLUTION, CONCENTRATE INTRAVENOUS ONCE
Status: COMPLETED | OUTPATIENT
Start: 2024-05-12 | End: 2024-05-12

## 2024-05-12 RX ORDER — AMLODIPINE BESYLATE 10 MG/1
10 TABLET ORAL
Status: COMPLETED | OUTPATIENT
Start: 2024-05-12 | End: 2024-05-12

## 2024-05-12 RX ORDER — DEXMEDETOMIDINE HYDROCHLORIDE 4 UG/ML
INJECTION, SOLUTION INTRAVENOUS
Status: COMPLETED
Start: 2024-05-12 | End: 2024-05-12

## 2024-05-12 RX ADMIN — AMLODIPINE BESYLATE 10 MG: 10 TABLET ORAL at 16:16

## 2024-05-12 RX ADMIN — DEXMEDETOMIDINE HYDROCHLORIDE 0.2 MCG/KG/HR: 4 INJECTION, SOLUTION INTRAVENOUS at 21:39

## 2024-05-12 RX ADMIN — SODIUM CHLORIDE, SODIUM LACTATE, POTASSIUM CHLORIDE, AND CALCIUM CHLORIDE 1000 ML: 600; 310; 30; 20 INJECTION, SOLUTION INTRAVENOUS at 16:16

## 2024-05-12 RX ADMIN — AMLODIPINE BESYLATE 10 MG: 10 TABLET ORAL at 14:45

## 2024-05-12 RX ADMIN — NICARDIPINE HYDROCHLORIDE 5 MG/HR: 0.1 INJECTION INTRAVENOUS at 19:09

## 2024-05-12 RX ADMIN — LEVETIRACETAM 1500 MG: 100 INJECTION INTRAVENOUS at 17:26

## 2024-05-12 RX ADMIN — NICARDIPINE HYDROCHLORIDE 5 MG/HR: 0.1 INJECTION INTRAVENOUS at 22:06

## 2024-05-12 RX ADMIN — SODIUM CHLORIDE 1000 ML: 9 INJECTION, SOLUTION INTRAVENOUS at 20:35

## 2024-05-12 RX ADMIN — SODIUM CHLORIDE, SODIUM LACTATE, POTASSIUM CHLORIDE, AND CALCIUM CHLORIDE 1000 ML: 600; 310; 30; 20 INJECTION, SOLUTION INTRAVENOUS at 15:11

## 2024-05-12 RX ADMIN — HEPARIN SODIUM 5000 UNITS: 5000 INJECTION INTRAVENOUS; SUBCUTANEOUS at 21:24

## 2024-05-12 ASSESSMENT — PAIN - FUNCTIONAL ASSESSMENT: PAIN_FUNCTIONAL_ASSESSMENT: NONE - DENIES PAIN

## 2024-05-12 NOTE — DISCHARGE INSTRUCTIONS
Discharge Instructions    Patient: Jason Joaquin MRN: [id number]  CSN: [id number]    YOB: 1980  Age: 43 y.o.  Sex: male    DOA: 5/12/2024       DIET:  cardiac diet    ACTIVITY: activity as tolerated  Home health care for Skilled care for Hypertension and medication management       PT/OT consult      ADDITIONAL INFORMATION: If you experience any of the following symptoms but not limited to Fever, chills, nausea, vomiting, diarrhea, change in mentation, falling, bleeding, shortness of breath, chest pain, please call your primary care physician or return to the emergency room if you cannot get hold of your doctor:     FOLLOW UP CARE:   Follow-up Information     [facility]. Schedule an appointment as soon as   possible for a visit in 1 week(s).    Contact information:  [address] [facility] [address] [phone]           Kameron Hay MD. Schedule an appointment as soon as possible for a   visit in 1 month(s).    Specialties: Cardiology, Internal Medicine  Contact information:  [address]  [facility] [address] [phone]             Chaz Longoria MD. Schedule an appointment as soon as possible for a   visit in 2 week(s).    Specialty: Nephrology  Contact information:  [address] [facility]  [address]  [facility] [id number] [phone]      Gustabo Solano MD  5/17/2024 10:48 AM

## 2024-05-12 NOTE — ED PROVIDER NOTES
components:    Troponin, High Sensitivity 83 (*)     All other components within normal limits   COMPREHENSIVE METABOLIC PANEL - Abnormal; Notable for the following components:    Sodium 132 (*)     Chloride 99 (*)     CO2 17 (*)     Glucose 141 (*)     BUN 27 (*)     Creatinine 2.49 (*)     Bun/Cre Ratio 11 (*)     Est, Glom Filt Rate 32 (*)     Total Protein 8.7 (*)     Globulin 4.7 (*)     All other components within normal limits   TROPONIN   URINE DRUG SCREEN       Radiologic Studies -   No orders to display         The laboratory results, imaging results, and other diagnostic exams were reviewed in the EMR.    Medical Decision Making   I am the first provider for this patient.    I reviewed the vital signs, available nursing notes, past medical history, past surgical history, family history and social history.    Vital Signs-Reviewed the patient's vital signs.    ED EKG interpretation:  Rhythm: Sinus rhythm at rate of 118, , QRS 92, Qtc 490, ST Segments nonspecific changes. This EKG was interpreted by Sarbjit Merida D.O.         Records Reviewed: Personally, on initial evaluation    MDM:   DDX includes but is not limited to: Electrolyte derangement, ACS, unknown substance effect, arrhythmia    Patient overall well-appearing, he is tachycardic but states he does not feel like his heart is going fast, denies any chest pain or shortness of breath.  He is completely intact neurologic exam, overall toxidrome likely more sympathomimetic.  Patient not diaphoretic at this time, he does have some areas of what appears to be swelling on his shirt from where he was earlier.  Will observe in the emergency department obtain labs as ordered to include delta troponin, UDS.  ECG nonspecific, no immediately actionable findings.  Additionally will give IV fluids.      Orders as below:  Orders Placed This Encounter   Procedures    Urine Drug Screen    CBC with Auto Differential    Troponin    Comprehensive Metabolic Panel  these medications      acetaminophen 325 MG tablet  Commonly known as: TYLENOL     diazePAM 5 MG tablet  Commonly known as: VALIUM     indapamide 2.5 MG tablet  Commonly known as: LOZOL     ketorolac 10 MG tablet  Commonly known as: TORADOL     oxyCODONE-acetaminophen 5-325 MG per tablet  Commonly known as: PERCOCET     spironolactone 25 MG tablet  Commonly known as: ALDACTONE              Disposition: Pending reevaluation remainder of results, likely discharge.    Patient condition at time of disposition: Stable    DISCHARGE NOTE:   Pt has been reexamined. Patient has no new complaints, changes, or physical findings.  Care plan outlined and precautions discussed.  Results were reviewed with the patient. All medications were reviewed with the patient. All of pt's questions and concerns were addressed.  Alarm symptoms and return precautions associated with chief complaint and evaluation were reviewed with the patient in detail.  The patient demonstrated adequate understanding.  Patient was instructed to follow up with PCP, as well as given strict return precautions to the ED upon further deterioration. Patient is ready for discharge home.          Dragon Disclaimer     Please note that this dictation was completed with [employer], the computer voice recognition software.  Quite often unanticipated grammatical, syntax, homophones, and other interpretive errors are inadvertently transcribed by the computer software.  Please disregard these errors.  Please excuse any errors that have escaped final proofreading.      Sarbjit Merida DO, PGY3  [facility]

## 2024-05-12 NOTE — ED NOTES
Report given at the bedside to Jeannette LITTLEJOHN. Pt sleeping. Attached to monitor. Cardene infusing.
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
## 2024-05-12 NOTE — ED NOTES
Assumed care of  pt. Pt minimally arousable at this time. Pt had seizure earlier. Pt on Cardene drip at 5mg/hr. Pt tachy. Seizure pads on bed.

## 2024-05-13 ENCOUNTER — APPOINTMENT (OUTPATIENT)
Facility: HOSPITAL | Age: 44
End: 2024-05-13
Payer: MEDICAID

## 2024-05-13 PROBLEM — R56.9 SEIZURE (HCC): Status: ACTIVE | Noted: 2024-05-13

## 2024-05-13 PROBLEM — F19.10 POLYSUBSTANCE ABUSE (HCC): Status: ACTIVE | Noted: 2024-05-13

## 2024-05-13 LAB
ANION GAP SERPL CALC-SCNC: 6 MMOL/L (ref 3–18)
BASOPHILS # BLD: 0 K/UL (ref 0–0.1)
BASOPHILS NFR BLD: 0 % (ref 0–2)
BUN SERPL-MCNC: 22 MG/DL (ref 7–18)
BUN/CREAT SERPL: 12 (ref 12–20)
CALCIUM SERPL-MCNC: 9.2 MG/DL (ref 8.5–10.1)
CHLORIDE SERPL-SCNC: 102 MMOL/L (ref 100–111)
CO2 SERPL-SCNC: 27 MMOL/L (ref 21–32)
CREAT SERPL-MCNC: 1.88 MG/DL (ref 0.6–1.3)
DIFFERENTIAL METHOD BLD: ABNORMAL
ECHO AO ASC DIAM: 3.3 CM
ECHO AO ASCENDING AORTA INDEX: 1.75 CM/M2
ECHO AO ROOT DIAM: 3.7 CM
ECHO AO ROOT INDEX: 1.96 CM/M2
ECHO AV PEAK GRADIENT: 3 MMHG
ECHO AV PEAK VELOCITY: 0.8 M/S
ECHO AV VELOCITY RATIO: 0.88
ECHO BSA: 1.86 M2
ECHO BSA: 1.86 M2
ECHO LA DIAMETER INDEX: 1.43 CM/M2
ECHO LA DIAMETER: 2.7 CM
ECHO LA TO AORTIC ROOT RATIO: 0.73
ECHO LA VOL A-L A2C: 26 ML (ref 18–58)
ECHO LA VOL A-L A4C: 35 ML (ref 18–58)
ECHO LA VOL BP: 30 ML (ref 18–58)
ECHO LA VOL MOD A2C: 25 ML (ref 18–58)
ECHO LA VOL MOD A4C: 30 ML (ref 18–58)
ECHO LA VOL/BSA BIPLANE: 16 ML/M2 (ref 16–34)
ECHO LA VOLUME AREA LENGTH: 33 ML
ECHO LA VOLUME INDEX A-L A2C: 14 ML/M2 (ref 16–34)
ECHO LA VOLUME INDEX A-L A4C: 19 ML/M2 (ref 16–34)
ECHO LA VOLUME INDEX AREA LENGTH: 17 ML/M2 (ref 16–34)
ECHO LA VOLUME INDEX MOD A2C: 13 ML/M2 (ref 16–34)
ECHO LA VOLUME INDEX MOD A4C: 16 ML/M2 (ref 16–34)
ECHO LV E' LATERAL VELOCITY: 4 CM/S
ECHO LV E' SEPTAL VELOCITY: 5 CM/S
ECHO LV EDV A2C: 40 ML
ECHO LV EDV A4C: 44 ML
ECHO LV EDV BP: 43 ML (ref 67–155)
ECHO LV EDV INDEX A4C: 23 ML/M2
ECHO LV EDV INDEX BP: 23 ML/M2
ECHO LV EDV NDEX A2C: 21 ML/M2
ECHO LV EJECTION FRACTION A2C: 56 %
ECHO LV EJECTION FRACTION A4C: 36 %
ECHO LV EJECTION FRACTION BIPLANE: 46 % (ref 55–100)
ECHO LV ESV A2C: 18 ML
ECHO LV ESV A4C: 28 ML
ECHO LV ESV BP: 23 ML (ref 22–58)
ECHO LV ESV INDEX A2C: 10 ML/M2
ECHO LV ESV INDEX A4C: 15 ML/M2
ECHO LV ESV INDEX BP: 12 ML/M2
ECHO LV FRACTIONAL SHORTENING: 36 % (ref 28–44)
ECHO LV INTERNAL DIMENSION DIASTOLE INDEX: 2.22 CM/M2
ECHO LV INTERNAL DIMENSION DIASTOLIC: 4.2 CM (ref 4.2–5.9)
ECHO LV INTERNAL DIMENSION SYSTOLIC INDEX: 1.43 CM/M2
ECHO LV INTERNAL DIMENSION SYSTOLIC: 2.7 CM
ECHO LV IVSD: 1.1 CM (ref 0.6–1)
ECHO LV MASS 2D: 167.4 G (ref 88–224)
ECHO LV MASS INDEX 2D: 88.6 G/M2 (ref 49–115)
ECHO LV POSTERIOR WALL DIASTOLIC: 1.2 CM (ref 0.6–1)
ECHO LV RELATIVE WALL THICKNESS RATIO: 0.57
ECHO LVOT MEAN GRADIENT: 1 MMHG
ECHO LVOT PEAK GRADIENT: 2 MMHG
ECHO LVOT PEAK VELOCITY: 0.7 M/S
ECHO LVOT VTI: 10.4 CM
ECHO MV A VELOCITY: 0.73 M/S
ECHO MV E DECELERATION TIME (DT): 279 MS
ECHO MV E VELOCITY: 0.38 M/S
ECHO MV E/A RATIO: 0.52
ECHO MV E/E' LATERAL: 9.5
ECHO MV E/E' RATIO (AVERAGED): 8.55
ECHO MV E/E' SEPTAL: 7.6
ECHO PV MAX VELOCITY: 0.5 M/S
ECHO PV PEAK GRADIENT: 1 MMHG
ECHO RV FREE WALL PEAK S': 10 CM/S
ECHO RV TAPSE: 1.8 CM (ref 1.7–?)
EKG ATRIAL RATE: 124 BPM
EKG DIAGNOSIS: NORMAL
EKG P AXIS: 74 DEGREES
EKG P-R INTERVAL: 138 MS
EKG Q-T INTERVAL: 336 MS
EKG QRS DURATION: 94 MS
EKG QTC CALCULATION (BAZETT): 482 MS
EKG R AXIS: 58 DEGREES
EKG T AXIS: 72 DEGREES
EKG VENTRICULAR RATE: 124 BPM
EOSINOPHIL # BLD: 0 K/UL (ref 0–0.4)
EOSINOPHIL NFR BLD: 0 % (ref 0–5)
ERYTHROCYTE [DISTWIDTH] IN BLOOD BY AUTOMATED COUNT: 12.9 % (ref 11.6–14.5)
GLUCOSE BLD STRIP.AUTO-MCNC: 104 MG/DL (ref 70–110)
GLUCOSE BLD STRIP.AUTO-MCNC: 104 MG/DL (ref 70–110)
GLUCOSE BLD STRIP.AUTO-MCNC: 109 MG/DL (ref 70–110)
GLUCOSE BLD STRIP.AUTO-MCNC: 91 MG/DL (ref 70–110)
GLUCOSE BLD STRIP.AUTO-MCNC: 96 MG/DL (ref 70–110)
GLUCOSE SERPL-MCNC: 117 MG/DL (ref 74–99)
HCT VFR BLD AUTO: 42.6 % (ref 36–48)
HGB BLD-MCNC: 14.7 G/DL (ref 13–16)
IMM GRANULOCYTES # BLD AUTO: 0.1 K/UL (ref 0–0.04)
IMM GRANULOCYTES NFR BLD AUTO: 0 % (ref 0–0.5)
LACTATE SERPL-SCNC: 1 MMOL/L (ref 0.4–2)
LYMPHOCYTES # BLD: 1.5 K/UL (ref 0.9–3.6)
LYMPHOCYTES NFR BLD: 13 % (ref 21–52)
MAGNESIUM SERPL-MCNC: 2 MG/DL (ref 1.6–2.6)
MCH RBC QN AUTO: 33.9 PG (ref 24–34)
MCHC RBC AUTO-ENTMCNC: 34.5 G/DL (ref 31–37)
MCV RBC AUTO: 98.2 FL (ref 78–100)
MONOCYTES # BLD: 0.7 K/UL (ref 0.05–1.2)
MONOCYTES NFR BLD: 6 % (ref 3–10)
NEUTS SEG # BLD: 8.9 K/UL (ref 1.8–8)
NEUTS SEG NFR BLD: 80 % (ref 40–73)
NRBC # BLD: 0 K/UL (ref 0–0.01)
NRBC BLD-RTO: 0 PER 100 WBC
PHOSPHATE SERPL-MCNC: 3.8 MG/DL (ref 2.5–4.9)
PLATELET # BLD AUTO: 195 K/UL (ref 135–420)
PMV BLD AUTO: 9.3 FL (ref 9.2–11.8)
POTASSIUM SERPL-SCNC: 3.7 MMOL/L (ref 3.5–5.5)
RBC # BLD AUTO: 4.34 M/UL (ref 4.35–5.65)
SODIUM SERPL-SCNC: 135 MMOL/L (ref 136–145)
TROPONIN I SERPL HS-MCNC: 143 NG/L (ref 0–78)
VAS LEFT ABI: 1.1
VAS LEFT ARM BP: 178 MMHG
VAS LEFT DORSALIS PEDIS BP: 195 MMHG
VAS LEFT PTA BP: 198 MMHG
VAS LEFT TBI: 0.59
VAS LEFT TOE PRESSURE: 106 MMHG
VAS RIGHT ABI: 1.12
VAS RIGHT ARM BP: 180 MMHG
VAS RIGHT DORSALIS PEDIS BP: 202 MMHG
VAS RIGHT PTA BP: 201 MMHG
VAS RIGHT TBI: 0.58
VAS RIGHT TOE PRESSURE: 104 MMHG
WBC # BLD AUTO: 11.2 K/UL (ref 4.6–13.2)

## 2024-05-13 PROCEDURE — 2000000000 HC ICU R&B

## 2024-05-13 PROCEDURE — 83735 ASSAY OF MAGNESIUM: CPT

## 2024-05-13 PROCEDURE — 93010 ELECTROCARDIOGRAM REPORT: CPT | Performed by: INTERNAL MEDICINE

## 2024-05-13 PROCEDURE — 70551 MRI BRAIN STEM W/O DYE: CPT

## 2024-05-13 PROCEDURE — 6360000002 HC RX W HCPCS: Performed by: PHYSICIAN ASSISTANT

## 2024-05-13 PROCEDURE — 36415 COLL VENOUS BLD VENIPUNCTURE: CPT

## 2024-05-13 PROCEDURE — 82962 GLUCOSE BLOOD TEST: CPT

## 2024-05-13 PROCEDURE — 99291 CRITICAL CARE FIRST HOUR: CPT | Performed by: INTERNAL MEDICINE

## 2024-05-13 PROCEDURE — 93306 TTE W/DOPPLER COMPLETE: CPT | Performed by: INTERNAL MEDICINE

## 2024-05-13 PROCEDURE — 2500000003 HC RX 250 WO HCPCS: Performed by: PHYSICIAN ASSISTANT

## 2024-05-13 PROCEDURE — 84484 ASSAY OF TROPONIN QUANT: CPT

## 2024-05-13 PROCEDURE — 6370000000 HC RX 637 (ALT 250 FOR IP): Performed by: STUDENT IN AN ORGANIZED HEALTH CARE EDUCATION/TRAINING PROGRAM

## 2024-05-13 PROCEDURE — 93306 TTE W/DOPPLER COMPLETE: CPT

## 2024-05-13 PROCEDURE — 93922 UPR/L XTREMITY ART 2 LEVELS: CPT

## 2024-05-13 PROCEDURE — APPSS15 APP SPLIT SHARED TIME 0-15 MINUTES: Performed by: PHYSICIAN ASSISTANT

## 2024-05-13 PROCEDURE — 94761 N-INVAS EAR/PLS OXIMETRY MLT: CPT

## 2024-05-13 PROCEDURE — 80048 BASIC METABOLIC PNL TOTAL CA: CPT

## 2024-05-13 PROCEDURE — 83605 ASSAY OF LACTIC ACID: CPT

## 2024-05-13 PROCEDURE — 6370000000 HC RX 637 (ALT 250 FOR IP)

## 2024-05-13 PROCEDURE — 6370000000 HC RX 637 (ALT 250 FOR IP): Performed by: PHYSICIAN ASSISTANT

## 2024-05-13 PROCEDURE — 84100 ASSAY OF PHOSPHORUS: CPT

## 2024-05-13 PROCEDURE — 93922 UPR/L XTREMITY ART 2 LEVELS: CPT | Performed by: INTERNAL MEDICINE

## 2024-05-13 PROCEDURE — 85025 COMPLETE CBC W/AUTO DIFF WBC: CPT

## 2024-05-13 RX ORDER — OXYCODONE AND ACETAMINOPHEN 7.5; 325 MG/1; MG/1
1 TABLET ORAL EVERY 4 HOURS PRN
Status: DISCONTINUED | OUTPATIENT
Start: 2024-05-13 | End: 2024-05-17 | Stop reason: HOSPADM

## 2024-05-13 RX ORDER — HYDRALAZINE HYDROCHLORIDE 25 MG/1
25 TABLET, FILM COATED ORAL EVERY 8 HOURS SCHEDULED
Status: DISCONTINUED | OUTPATIENT
Start: 2024-05-13 | End: 2024-05-14

## 2024-05-13 RX ORDER — OXYCODONE AND ACETAMINOPHEN 10; 325 MG/1; MG/1
1 TABLET ORAL EVERY 4 HOURS PRN
Status: DISCONTINUED | OUTPATIENT
Start: 2024-05-13 | End: 2024-05-14

## 2024-05-13 RX ORDER — CLONIDINE 0.2 MG/24H
1 PATCH, EXTENDED RELEASE TRANSDERMAL WEEKLY
Status: DISCONTINUED | OUTPATIENT
Start: 2024-05-13 | End: 2024-05-14

## 2024-05-13 RX ORDER — NICARDIPINE HYDROCHLORIDE 0.1 MG/ML
2.5-15 INJECTION INTRAVENOUS CONTINUOUS
Status: DISCONTINUED | OUTPATIENT
Start: 2024-05-13 | End: 2024-05-14

## 2024-05-13 RX ADMIN — POTASSIUM CHLORIDE 10 MEQ: 7.46 INJECTION, SOLUTION INTRAVENOUS at 00:26

## 2024-05-13 RX ADMIN — POTASSIUM CHLORIDE 10 MEQ: 7.46 INJECTION, SOLUTION INTRAVENOUS at 01:14

## 2024-05-13 RX ADMIN — HYDRALAZINE HYDROCHLORIDE 25 MG: 25 TABLET ORAL at 14:32

## 2024-05-13 RX ADMIN — NICARDIPINE HYDROCHLORIDE 15 MG/HR: 0.1 INJECTION INTRAVENOUS at 23:14

## 2024-05-13 RX ADMIN — HYDRALAZINE HYDROCHLORIDE 25 MG: 25 TABLET ORAL at 21:11

## 2024-05-13 RX ADMIN — AMLODIPINE BESYLATE 10 MG: 10 TABLET ORAL at 08:31

## 2024-05-13 RX ADMIN — NICARDIPINE HYDROCHLORIDE 7.5 MG/HR: 0.1 INJECTION INTRAVENOUS at 19:28

## 2024-05-13 RX ADMIN — LEVETIRACETAM 500 MG: 100 INJECTION INTRAVENOUS at 05:39

## 2024-05-13 RX ADMIN — HEPARIN SODIUM 5000 UNITS: 5000 INJECTION INTRAVENOUS; SUBCUTANEOUS at 14:33

## 2024-05-13 RX ADMIN — HEPARIN SODIUM 5000 UNITS: 5000 INJECTION INTRAVENOUS; SUBCUTANEOUS at 05:39

## 2024-05-13 RX ADMIN — DEXMEDETOMIDINE HYDROCHLORIDE 0.4 MCG/KG/HR: 4 INJECTION, SOLUTION INTRAVENOUS at 07:35

## 2024-05-13 RX ADMIN — NICARDIPINE HYDROCHLORIDE 5 MG/HR: 0.1 INJECTION INTRAVENOUS at 15:22

## 2024-05-13 RX ADMIN — LEVETIRACETAM 500 MG: 100 INJECTION INTRAVENOUS at 18:19

## 2024-05-13 RX ADMIN — OXYCODONE AND ACETAMINOPHEN 1 TABLET: 10; 325 TABLET ORAL at 23:45

## 2024-05-13 RX ADMIN — NICARDIPINE HYDROCHLORIDE 12.5 MG/HR: 0.1 INJECTION INTRAVENOUS at 21:44

## 2024-05-13 ASSESSMENT — PAIN SCALES - GENERAL
PAINLEVEL_OUTOF10: 0
PAINLEVEL_OUTOF10: 0
PAINLEVEL_OUTOF10: 7

## 2024-05-13 ASSESSMENT — PAIN DESCRIPTION - DESCRIPTORS: DESCRIPTORS: ACHING

## 2024-05-13 ASSESSMENT — PAIN DESCRIPTION - ORIENTATION: ORIENTATION: MID

## 2024-05-13 ASSESSMENT — PAIN DESCRIPTION - LOCATION: LOCATION: BACK

## 2024-05-13 NOTE — ED PROVIDER NOTES
Patient care assumed from Dr. Patterson.  Refer to original note for more details.  Patient initially presented for overdose after presumably using heroin.  He had a seizure here and remains hypertensive.  Since then he has remained altered.  There is current concern for PRES. spoke with Dr. Maurice of neurology who is recommending MRI.  CT head is negative for any acute intracranial findings.  Patient has been started on a Cardene infusion.  Spoke with intensivist, Dr. Alvarado for admission.  It does appear in the EMR that he has had this presentation with ICU admission on Cardene over a year ago at [facility].    Critical Care Time:  The services I provided to this patient were to treat and/or prevent clinically significant deterioration that could result in the failure of one or more body systems and/or organ systems due to hypertensive emergency.    Services included the following:  -reviewing nursing notes and old charts  -vital sign assessments  -direct patient care  -medication orders and management  -interpreting and reviewing diagnostic studies/labs  -re-evaluations  -documentation time    Aggregate critical care time was 47 minutes, which includes only time during which I was engaged in work directly related to the patient's care as described above, whether I was at bedside or elsewhere in the Emergency Department. It did not include time spent performing other reported procedures or the services of residents, students, nurses, or advance practice providers.       Julio Beasley,   05/12/24 2022       Julio Beasley,   05/13/24 2104
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
## 2024-05-13 NOTE — ED NOTES
Pt changed out of close. Belongings placed in bags at bedside. Rapid EEG placed on pt per MD Beasley. MRI form completed.

## 2024-05-13 NOTE — INTERDISCIPLINARY ROUNDS
[facility]  Pulmonary, Critical Care, and Sleep Medicine  Interdisciplinary and Ventilator Weaning Rounds    Patient discussed in morning walking rounds and interdisciplinary rounds.    ICU admission day: 2    Overnight events:   Admitted overnight, see H&P    Assessments and best practice:  Ventilator       N/a  Glycemic control  Diet  No diet orders on file  Stress ulcer prophylaxis.  not indicated  DVT prophylaxis.  SQH  Need for Lines, crain assessed.  Yes  Restraint Reevaluation   Not needed    Disposition regarding transferring out of the ICU  RED      Family contact/MPOA:   Family updated     Palliative consult within 3 days of admission to ICU-  Ethics Guidance: 21 days      Daily Plans:  MRI brain to rule out PRES  Vascular arterial study lower extremities   Wean off Precedex   Diet  Downgrade after Precedex off        NIKOLAY time 11 minutes        JUANITA MARTI PA-C  05/13/24  Pulmonary, Critical Care Medicine  [facility]

## 2024-05-13 NOTE — PROGRESS NOTES
Pt found to be standing next to stretcher again, urinating on the floor, off of cardiac monitor. Pt is difficult to redirect at this time. Patria, ICU PA made aware and received order for Precedex.

## 2024-05-13 NOTE — WOUND CARE
SKIN HEALTH AND PREVENTION ROUNDING    Patient was observed and the following prevention interventions were noted: Glide Sheet, Limited layers, and Silicone dressing to sacrum    Primary RN present at bedside: Yes  Primary RN name: Yumiko LITTLEJOHN    Interventions added: NA    Wound 05/12/24 Thigh Left;Medial (Active)   Wound Image   05/13/24 1040   Wound Etiology Traumatic 05/13/24 1040   Dressing Status Clean;Dry;Intact 05/13/24 0800   Dressing/Treatment Open to air 05/13/24 1040   Wound Length (cm) 12 cm 05/13/24 1040   Wound Width (cm) 6 cm 05/13/24 1040   Wound Surface Area (cm^2) 72 cm^2 05/13/24 1040   Wound Assessment Blood filled blister;Ruptured blister 05/13/24 1040   Drainage Amount Scant (moist but unmeasurable) 05/13/24 1040   Drainage Description Sanguinous 05/13/24 1040   Odor None 05/13/24 1040   Chinyere-wound Assessment Intact 05/13/24 1040   Number of days: 0       Wound 05/12/24 Toe (Comment  which one) Left Chronic dry skin/poor personal hygeine (Active)   Wound Image   05/13/24 1043   Dressing Status Clean;Dry;Intact 05/13/24 0800   Number of days: 0        Chronic dry skin/poor personal hygiene     Recommend leaving left thigh wound open to air for now.  If begins draining, then cover with non-adherent and cover with dry dressing.      Rosmery MAHONEYN, RN, WCC, COCN, CLIN IV  [facility] Wound Care Dept.  Office: [phone]  Work Cell: [phone]

## 2024-05-13 NOTE — CONSULTS
g/dL    Hematocrit 42.6 36.0 - 48.0 %    MCV 98.2 78.0 - 100.0 FL    MCH 33.9 24.0 - 34.0 PG    MCHC 34.5 31.0 - 37.0 g/dL    RDW 12.9 11.6 - 14.5 %    Platelets 195 135 - 420 K/uL    MPV 9.3 9.2 - 11.8 FL    Nucleated RBCs 0.0 0  WBC    nRBC 0.00 0.00 - 0.01 K/uL    Neutrophils % 80 (H) 40 - 73 %    Lymphocytes % 13 (L) 21 - 52 %    Monocytes % 6 3 - 10 %    Eosinophils % 0 0 - 5 %    Basophils % 0 0 - 2 %    Immature Granulocytes % 0 0.0 - 0.5 %    Neutrophils Absolute 8.9 (H) 1.8 - 8.0 K/UL    Lymphocytes Absolute 1.5 0.9 - 3.6 K/UL    Monocytes Absolute 0.7 0.05 - 1.2 K/UL    Eosinophils Absolute 0.0 0.0 - 0.4 K/UL    Basophils Absolute 0.0 0.0 - 0.1 K/UL    Immature Granulocytes Absolute 0.1 (H) 0.00 - 0.04 K/UL    Differential Type AUTOMATED     Basic Metabolic Panel    Collection Time: 05/13/24  3:54 AM   Result Value Ref Range    Sodium 135 (L) 136 - 145 mmol/L    Potassium 3.7 3.5 - 5.5 mmol/L    Chloride 102 100 - 111 mmol/L    CO2 27 21 - 32 mmol/L    Anion Gap 6 3.0 - 18 mmol/L    Glucose 117 (H) 74 - 99 mg/dL    BUN 22 (H) 7.0 - 18 MG/DL    Creatinine 1.88 (H) 0.6 - 1.3 MG/DL    Bun/Cre Ratio 12 12 - 20      Est, Glom Filt Rate 44 (L) >60 ml/min/1.73m2    Calcium 9.2 8.5 - 10.1 MG/DL   Magnesium    Collection Time: 05/13/24  3:54 AM   Result Value Ref Range    Magnesium 2.0 1.6 - 2.6 mg/dL   Phosphorus    Collection Time: 05/13/24  3:54 AM   Result Value Ref Range    Phosphorus 3.8 2.5 - 4.9 MG/DL   Troponin    Collection Time: 05/13/24  3:54 AM   Result Value Ref Range    Troponin, High Sensitivity 143 (HH) 0 - 78 ng/L   POCT Glucose    Collection Time: 05/13/24  5:42 AM   Result Value Ref Range    POC Glucose 104 70 - 110 mg/dL   Echo (TTE) complete (PRN contrast/bubble/strain/3D)    Collection Time: 05/13/24  9:42 AM   Result Value Ref Range    IVSd 1.1 (A) 0.6 - 1.0 cm    LVIDd 4.2 4.2 - 5.9 cm    LVIDs 2.7 cm    LVPWd 1.2 (A) 0.6 - 1.0 cm    EF BP 46 (A) 55 - 100 %    LV Ejection Fraction    It is a great pleasure to see Jason Joaquin, a 43 y.o. male today in the hospital. Based on history, physical, lab and imaging data, the most likely suspected mechanism is Hypertensive emergency - in the setting Polysubstance abuse - cocaine and opioid positive   ?  Recommendations/ plan:  MRI Brain with and without contrast and EEG pending  Continue Keppra 500 mg bid  Correction of any underlying residual infectious and/or metabolic abnormalities per primary team.  Avoid use of any brain impairing medications as much as possible, including benzodiazepines, anticholinergic medications, opioid pain medications.   - Please do not hesitate to call with questions or concerns.  - Thank you for the opportunity to participate in the care of your patient.Please let us know if we can provide any additional information.  ?      Oj Monteiro MD  5/13/2024    ?

## 2024-05-13 NOTE — PROGRESS NOTES
attended the interdisciplinary rounds for Jason Joaquin, who is a 43 y.o.,male. Patient's Primary Language is: English.   According to the patient's EMR Christianity Affiliation is: None.     The reason the Patient came to the hospital is:   Patient Active Problem List    Diagnosis Date Noted    Hypertensive emergency 05/12/2024          Plan:   participated and listen to the recommendations of the IDR team.    Patient is a new admission. Follow commands. No major incidents overnight. Discuss diet for patient. Patient candidate for transfer/stepdown    Chaplains will continue to follow and will provide pastoral care on an as needed/requested basis.     recommends bedside caregivers page  on duty if patient shows signs of acute spiritual or emotional distress.     Roverto Morales  Staff   Spiritual Health   [phone]

## 2024-05-13 NOTE — PROGRESS NOTES
Pt found to have ripped off his cardiac leads, standing on the side of the stretcher urinating on the floor. Pt assisted back to bed and placed back on monitor. LUIS España at bedside and made aware that Ceribell had been removed by patient.

## 2024-05-13 NOTE — PROGRESS NOTES
results found for this or any previous visit from the past 3650 days.      No results found for this or any previous visit.              IMPRESSION:   Hypertensive emergency - in the setting of crack/cocaine  Acute encephalopathy - likely postictal, onset following seizure like activity  Polysubstance abuse - cocaine and opioid positive  Lactic acidosis - in the setting of seizure, resolved  Seizure/ tonic clonic activity x1 witnessed in the ED  OLIVA on CKD     Patient Active Problem List   Diagnosis    Hypertensive emergency        RECOMMENDATIONS:   Neuro: add precedex as needed for agitation. Loaded with keppra, continue BID, ceribell negative  Pulm: Supplemental O2 via NC as needed, titrate flow for goal SPO2> 90%, pulmonary hygiene care, Aspiration precautions, Keep HOB >30 degrees  CVS: nicardipine gtt weaned off  Fluids: n/a  GI: keep NPO for now  Renal:  Trend Renal indices, Strict Is/Os  Hem/Onc: Monitor for s/o active bleeding.   I/D: Trend WBCs and temperature curve.  Endocrine: Q6 glucoses, SSI.   Metabolic:  Daily BMP, mag, phos. Trend lytes, replace as needed.   Musc/Skin: no acute issues, wound care as needed  Full code       Best practice : APPLICABLE TO PATIENT    Glycemic control  Stress ulcer prophylaxis.   not indicated  DVT prophylaxis.   SQH  Need for Lines, crain assessed.  Palliative care evaluation.  Restraints need.  Attending Non-violent Restraint Reevaluation   N/a      NIKOLAY time - 15 min  Patria Gilmore PA-C  05/13/24  Pulmonary, Critical Care Medicine  [facility]

## 2024-05-13 NOTE — H&P
[facility]  Pulmonary, Critical Care, and Sleep Medicine    Name: Jason Joaquin MRN: [id number]   : 1980 Hospital: [facility]   Date: 2024        Critical Care History and Physical      IMPRESSION:   Hypertensive emergency - in the setting of crack/cocaine  Acute encephalopathy - likely postictal, onset following seizure like activity  Polysubstance abuse - cocaine and opioid positive  Lactic acidosis - in the setting of seizure, improving  Seizure/ tonic clonic activity x1 witnessed in the ED  OLIVA on CKD     Patient Active Problem List   Diagnosis    Hypertensive emergency        RECOMMENDATIONS:   Neuro: add precedex as needed for agitation. Loaded with keppra, continue BID, place ceribell  Pulm: Supplemental O2 via NC as needed, titrate flow for goal SPO2> 90%, pulmonary hygiene care, Aspiration precautions, Keep HOB >30 degrees  CVS: continue nicardipine gtt with goal -180  Fluids: n/a  GI: keep NPO for now  Renal:  Trend Renal indices, Strict Is/Os  Hem/Onc: Monitor for s/o active bleeding.   I/D: Trend WBCs and temperature curve.  Endocrine: Q6 glucoses, SSI.   Metabolic:  Daily BMP, mag, phos. Trend lytes, replace as needed.   Musc/Skin: no acute issues, wound care as needed  Full code       Best practice : APPLICABLE TO PATIENT    Glycemic control  Stress ulcer prophylaxis.   not indicated  DVT prophylaxis.   SQH  Need for Lines, crain assessed.  Palliative care evaluation.  Restraints need.  Attending Non-violent Restraint Reevaluation   N/a        Subjective/History:       Patient is a 43 y.o. male w/ pmhx of HTN and polysubstance abuse who presented to the ED complaining of a syncopal episode. He reported to the ED that he used heroin today but that it looked different, after which he passed out. He was noted to be profoundly hypertensive on arrival, with SBPs >200. He was given IV anti-hypertensives during workup. Work up significant for elevated  Max:97.7 °F (36.5 °C)       Intake/Output:   Last shift:      No intake/output data recorded.  Last 3 shifts: 05/11 0701 - 05/12 1900  In: 2000   Out: -     Intake/Output Summary (Last 24 hours) at 5/12/2024 2104  Last data filed at 5/12/2024 1728  Gross per 24 hour   Intake 2000 ml   Output --   Net 2000 ml           Physical Exam:     General:  Alert, confused   Head:  Normocephalic, without obvious abnormality, atraumatic.   Eyes:  Conjunctivae/corneas clear. PERRL   Nose: Nares normal. Septum midline. Mucosa normal. No drainage or sinus tenderness.   Throat: Lips, mucosa, and tongue normal. Teeth and gums normal.   Neck: Supple, symmetrical, trachea midline   Lungs:   Clear to auscultation bilaterally.   Chest wall:  No tenderness or deformity.   Heart:  Regular rate and rhythm, S1, S2 normal, no murmur, click, rub or gallop.   Abdomen:   Soft, non-tender. Bowel sounds normal. No masses,  No organomegaly.   Extremities: Extremities normal, atraumatic, no cyanosis or edema.    Pulses: 2+ and symmetric all extremities.   Skin: Skin color, texture, turgor normal. No rashes or lesions   Neurologic: Following commands, does not answer questions appropriately, moving all extremities spontaneously         Data:   I have independently reviewed and interpreted all labs, imaging data and additional testing relevant to patient's care and clinical decision making.      Lab Results   Component Value Date    WBC 9.4 05/12/2024    HGB 15.6 05/12/2024    HCT 48.1 (H) 05/12/2024    .1 (H) 05/12/2024     05/12/2024    LYMPHOPCT 14 (L) 05/12/2024    RBC 4.71 05/12/2024    MCH 33.1 05/12/2024    MCHC 32.4 05/12/2024    RDW 13.1 05/12/2024     Lab Results   Component Value Date     (L) 05/12/2024    K UNABLE TO PERFORM TEST DUE TO HEMOLYSIS 05/12/2024     05/12/2024    CO2 17 (L) 05/12/2024    BUN 25 (H) 05/12/2024    CREATININE 2.30 (H) 05/12/2024    GLUCOSE 111 (H) 05/12/2024    CALCIUM 9.1 05/12/2024

## 2024-05-13 NOTE — PROGRESS NOTES
Assumed care of patient. HR remains in 150's at this time. Ceribell in place. Cardene continues at 7.5mg/hr. Pt is resting quietly with eyes closed but arousable.

## 2024-05-14 LAB
ANION GAP SERPL CALC-SCNC: 8 MMOL/L (ref 3–18)
BASOPHILS # BLD: 0 K/UL (ref 0–0.1)
BASOPHILS NFR BLD: 0 % (ref 0–2)
BUN SERPL-MCNC: 36 MG/DL (ref 7–18)
BUN/CREAT SERPL: 17 (ref 12–20)
CALCIUM SERPL-MCNC: 8.8 MG/DL (ref 8.5–10.1)
CHLORIDE SERPL-SCNC: 102 MMOL/L (ref 100–111)
CO2 SERPL-SCNC: 24 MMOL/L (ref 21–32)
CREAT SERPL-MCNC: 2.17 MG/DL (ref 0.6–1.3)
DIFFERENTIAL METHOD BLD: ABNORMAL
EOSINOPHIL # BLD: 0 K/UL (ref 0–0.4)
EOSINOPHIL NFR BLD: 0 % (ref 0–5)
ERYTHROCYTE [DISTWIDTH] IN BLOOD BY AUTOMATED COUNT: 13.2 % (ref 11.6–14.5)
GLUCOSE BLD STRIP.AUTO-MCNC: 101 MG/DL (ref 70–110)
GLUCOSE BLD STRIP.AUTO-MCNC: 110 MG/DL (ref 70–110)
GLUCOSE BLD STRIP.AUTO-MCNC: 185 MG/DL (ref 70–110)
GLUCOSE BLD STRIP.AUTO-MCNC: 191 MG/DL (ref 70–110)
GLUCOSE BLD STRIP.AUTO-MCNC: 97 MG/DL (ref 70–110)
GLUCOSE SERPL-MCNC: 121 MG/DL (ref 74–99)
HCT VFR BLD AUTO: 41.7 % (ref 36–48)
HGB BLD-MCNC: 14.2 G/DL (ref 13–16)
IMM GRANULOCYTES # BLD AUTO: 0.1 K/UL (ref 0–0.04)
IMM GRANULOCYTES NFR BLD AUTO: 0 % (ref 0–0.5)
LYMPHOCYTES # BLD: 1.7 K/UL (ref 0.9–3.6)
LYMPHOCYTES NFR BLD: 12 % (ref 21–52)
MAGNESIUM SERPL-MCNC: 2 MG/DL (ref 1.6–2.6)
MAGNESIUM SERPL-MCNC: 2 MG/DL (ref 1.6–2.6)
MCH RBC QN AUTO: 33.9 PG (ref 24–34)
MCHC RBC AUTO-ENTMCNC: 34.1 G/DL (ref 31–37)
MCV RBC AUTO: 99.5 FL (ref 78–100)
MONOCYTES # BLD: 1.2 K/UL (ref 0.05–1.2)
MONOCYTES NFR BLD: 8 % (ref 3–10)
NEUTS SEG # BLD: 11.1 K/UL (ref 1.8–8)
NEUTS SEG NFR BLD: 79 % (ref 40–73)
NRBC # BLD: 0 K/UL (ref 0–0.01)
NRBC BLD-RTO: 0 PER 100 WBC
PHOSPHATE SERPL-MCNC: 3.1 MG/DL (ref 2.5–4.9)
PLATELET # BLD AUTO: 200 K/UL (ref 135–420)
PMV BLD AUTO: 9.7 FL (ref 9.2–11.8)
POTASSIUM SERPL-SCNC: 3.3 MMOL/L (ref 3.5–5.5)
POTASSIUM SERPL-SCNC: 3.4 MMOL/L (ref 3.5–5.5)
RBC # BLD AUTO: 4.19 M/UL (ref 4.35–5.65)
SODIUM SERPL-SCNC: 134 MMOL/L (ref 136–145)
WBC # BLD AUTO: 14 K/UL (ref 4.6–13.2)

## 2024-05-14 PROCEDURE — 2000000000 HC ICU R&B

## 2024-05-14 PROCEDURE — 6360000002 HC RX W HCPCS: Performed by: PHYSICIAN ASSISTANT

## 2024-05-14 PROCEDURE — 6370000000 HC RX 637 (ALT 250 FOR IP): Performed by: STUDENT IN AN ORGANIZED HEALTH CARE EDUCATION/TRAINING PROGRAM

## 2024-05-14 PROCEDURE — 51798 US URINE CAPACITY MEASURE: CPT

## 2024-05-14 PROCEDURE — 6360000002 HC RX W HCPCS: Performed by: NURSE PRACTITIONER

## 2024-05-14 PROCEDURE — 84132 ASSAY OF SERUM POTASSIUM: CPT

## 2024-05-14 PROCEDURE — 36415 COLL VENOUS BLD VENIPUNCTURE: CPT

## 2024-05-14 PROCEDURE — 6370000000 HC RX 637 (ALT 250 FOR IP): Performed by: PHYSICIAN ASSISTANT

## 2024-05-14 PROCEDURE — 82962 GLUCOSE BLOOD TEST: CPT

## 2024-05-14 PROCEDURE — 6370000000 HC RX 637 (ALT 250 FOR IP): Performed by: NURSE PRACTITIONER

## 2024-05-14 PROCEDURE — APPSS15 APP SPLIT SHARED TIME 0-15 MINUTES: Performed by: NURSE PRACTITIONER

## 2024-05-14 PROCEDURE — 94761 N-INVAS EAR/PLS OXIMETRY MLT: CPT

## 2024-05-14 PROCEDURE — 99291 CRITICAL CARE FIRST HOUR: CPT | Performed by: INTERNAL MEDICINE

## 2024-05-14 PROCEDURE — 80048 BASIC METABOLIC PNL TOTAL CA: CPT

## 2024-05-14 PROCEDURE — 83735 ASSAY OF MAGNESIUM: CPT

## 2024-05-14 PROCEDURE — 85025 COMPLETE CBC W/AUTO DIFF WBC: CPT

## 2024-05-14 PROCEDURE — 6370000000 HC RX 637 (ALT 250 FOR IP): Performed by: INTERNAL MEDICINE

## 2024-05-14 PROCEDURE — 6370000000 HC RX 637 (ALT 250 FOR IP)

## 2024-05-14 PROCEDURE — 84100 ASSAY OF PHOSPHORUS: CPT

## 2024-05-14 RX ORDER — HYDRALAZINE HYDROCHLORIDE 50 MG/1
50 TABLET, FILM COATED ORAL EVERY 8 HOURS SCHEDULED
Status: DISCONTINUED | OUTPATIENT
Start: 2024-05-14 | End: 2024-05-14

## 2024-05-14 RX ORDER — MAGNESIUM SULFATE 1 G/100ML
1000 INJECTION INTRAVENOUS ONCE
Status: COMPLETED | OUTPATIENT
Start: 2024-05-14 | End: 2024-05-14

## 2024-05-14 RX ORDER — HYDRALAZINE HYDROCHLORIDE 50 MG/1
100 TABLET, FILM COATED ORAL EVERY 8 HOURS SCHEDULED
Status: DISCONTINUED | OUTPATIENT
Start: 2024-05-14 | End: 2024-05-16

## 2024-05-14 RX ORDER — NICARDIPINE HYDROCHLORIDE 0.1 MG/ML
2.5-15 INJECTION INTRAVENOUS CONTINUOUS
Status: DISCONTINUED | OUTPATIENT
Start: 2024-05-14 | End: 2024-05-15

## 2024-05-14 RX ORDER — SPIRONOLACTONE 25 MG/1
25 TABLET ORAL DAILY
Status: DISCONTINUED | OUTPATIENT
Start: 2024-05-14 | End: 2024-05-17

## 2024-05-14 RX ORDER — MULTIVITAMIN WITH IRON
1 TABLET ORAL DAILY
Status: DISCONTINUED | OUTPATIENT
Start: 2024-05-14 | End: 2024-05-17 | Stop reason: HOSPADM

## 2024-05-14 RX ADMIN — HYDRALAZINE HYDROCHLORIDE 50 MG: 50 TABLET ORAL at 14:07

## 2024-05-14 RX ADMIN — NICARDIPINE HYDROCHLORIDE 12.5 MG/HR: 0.1 INJECTION INTRAVENOUS at 06:12

## 2024-05-14 RX ADMIN — POTASSIUM BICARBONATE 40 MEQ: 782 TABLET, EFFERVESCENT ORAL at 06:38

## 2024-05-14 RX ADMIN — NICARDIPINE HYDROCHLORIDE 7.5 MG/HR: 0.1 INJECTION INTRAVENOUS at 14:07

## 2024-05-14 RX ADMIN — LEVETIRACETAM 500 MG: 100 INJECTION INTRAVENOUS at 17:24

## 2024-05-14 RX ADMIN — HYDRALAZINE HYDROCHLORIDE 100 MG: 50 TABLET ORAL at 22:49

## 2024-05-14 RX ADMIN — HYDRALAZINE HYDROCHLORIDE 25 MG: 25 TABLET ORAL at 05:31

## 2024-05-14 RX ADMIN — NICARDIPINE HYDROCHLORIDE 10 MG/HR: 0.1 INJECTION INTRAVENOUS at 11:39

## 2024-05-14 RX ADMIN — LEVETIRACETAM 500 MG: 100 INJECTION INTRAVENOUS at 05:31

## 2024-05-14 RX ADMIN — MAGNESIUM SULFATE HEPTAHYDRATE 1000 MG: 1 INJECTION, SOLUTION INTRAVENOUS at 11:38

## 2024-05-14 RX ADMIN — NICARDIPINE HYDROCHLORIDE 2.5 MG/HR: 0.1 INJECTION INTRAVENOUS at 21:36

## 2024-05-14 RX ADMIN — NICARDIPINE HYDROCHLORIDE 10 MG/HR: 0.1 INJECTION INTRAVENOUS at 09:48

## 2024-05-14 RX ADMIN — NICARDIPINE HYDROCHLORIDE 15 MG/HR: 0.1 INJECTION INTRAVENOUS at 04:48

## 2024-05-14 RX ADMIN — NICARDIPINE HYDROCHLORIDE 2.5 MG/HR: 0.1 INJECTION INTRAVENOUS at 16:51

## 2024-05-14 RX ADMIN — NICARDIPINE HYDROCHLORIDE 12.5 MG/HR: 0.1 INJECTION INTRAVENOUS at 07:55

## 2024-05-14 RX ADMIN — NICARDIPINE HYDROCHLORIDE 10 MG/HR: 0.1 INJECTION INTRAVENOUS at 11:45

## 2024-05-14 RX ADMIN — THERA TABS 1 TABLET: TAB at 12:08

## 2024-05-14 RX ADMIN — NICARDIPINE HYDROCHLORIDE 12.5 MG/HR: 0.1 INJECTION INTRAVENOUS at 02:10

## 2024-05-14 RX ADMIN — AMLODIPINE BESYLATE 10 MG: 10 TABLET ORAL at 08:15

## 2024-05-14 RX ADMIN — SPIRONOLACTONE 25 MG: 25 TABLET ORAL at 08:15

## 2024-05-14 RX ADMIN — NICARDIPINE HYDROCHLORIDE 15 MG/HR: 0.1 INJECTION INTRAVENOUS at 00:41

## 2024-05-14 ASSESSMENT — PAIN SCALES - GENERAL
PAINLEVEL_OUTOF10: 0

## 2024-05-14 NOTE — PROGRESS NOTES
[facility].  Pulmonary, Critical Care, and Sleep Medicine    Name: Jason Joaquin MRN: [id number]   : 1980 Hospital: [facility]   Date: 2024  Admission Date: 2024     Chart and notes reviewed. Data reviewed. I have evaluated all findings.    [x]I have reviewed the flowsheet and previous day’s notes.    []The patient is unable to give any meaningful history or review of systems because the patient is:  []Intubated []Sedated   []Unresponsive      []The patient is critically ill on      []Mechanical ventilation []Pressors   []BiPAP []         Interval HPI:  Patient is a 43 y.o. male w/ pmhx of HTN and polysubstance abuse who presented to the ED complaining of a syncopal episode. He reported to the ED that he used heroin today but that it looked different, after which he passed out. He was noted to be profoundly hypertensive on arrival, with SBPs >200. He was given IV anti-hypertensives during workup. Work up significant for elevated Cr from baseline and mild hyponatremia. During work up, he had what appeared to be a stroke w/ associated tonic clonic activity, after which he was altered. He was started on Keppra and Neurology following. He was sent for CT head for concern for PRES which was negative for any acute process. He was started on nicardipine gtt due to refractory hypertension and worsening symptoms. PO antihypertensives have been incorporated and now weaning down on Cardene gtt. MRI  with findings of PRES, however, patient now awake and alert.        Subjective 24  Hospital Day: 24   Vent Day: N/A   Overnight events:Restarted on Cardene gtt overnight   Mentation/Activity: A&O x4   Respiratory/ Secretions: stable on room air   Hemodynamics: tachycardic, remains on Cardene gtt   Urine output, bowel:  see I&O   Diet: regular   Need for procedures: N/A               ROS:A comprehensive review of systems was negative.    Events, medications,  likely postictal, onset following seizure like activity, resolved  Polysubstance abuse - cocaine and opioid positive  Lactic acidosis - in the setting of seizure, resolved   Seizure/ tonic clonic activity x1 witnessed in the ED  OLIVA on CKD  CHFrEF- Echo 5/13, EF 45-50%      Patient Active Problem List   Diagnosis    Hypertensive emergency    Polysubstance abuse (HCC)    Seizure (HCC)        RECOMMENDATIONS:   Neuro:  Continue Keppra 500 mg BID, MRI with findings of PRES, now awake and alert   Pulm: Supplemental O2 via NC as needed, titrate flow for goal SPO2> 90%, pulmonary hygiene care, Aspiration precautions, Keep HOB >30 degrees  CVS: continue nicardipine gtt with goal , continue PO antihypertensives-->Norvasc, , Hydralazine, and Aldactone, Will d/c Clonidine 0.2 patch since already on Hydralazine and increase Hydralazine dose   GI: regular diet   Renal:  Trend Renal indices, Strict Is/Os  Hem/Onc: Monitor for s/o active bleeding.   I/D: No bx needed, Trend WBCs and temperature curve.  Endocrine: Q6 glucoses, SSI.   Metabolic:  Daily BMP, mag, phos. Trend lytes, replace as needed.   Musc/Skin: no acute issues, wound care as needed     Full Code No additional code details  Discussed in interdisciplinary rounds     Best practice :    Glycemic control  IHI ICU bundles:   N/A   Mech Vent patients-    N/A   Stress ulcer prophylaxis.   N/A   DVT prophylaxis.   N/A   Need for Lines, crain assessed.  Palliative care evaluation.  Restraints need.  Attending Non-violent Restraint Reevaluation   No restraints needed        10 minutes were spent with the patient at the bedside. This care involved high complexity decision making to assess, manipulate, and support vital system functions, to treat this degreee vital organ system failure and to prevent further life threatening deterioration of the patient’s condition  The services I provided to this patient were to treat and/or prevent clinically significant deterioration

## 2024-05-14 NOTE — INTERDISCIPLINARY ROUNDS
[facility]  Pulmonary, Critical Care, and Sleep Medicine  Interdisciplinary and Ventilator Weaning Rounds    Patient discussed in morning walking rounds and interdisciplinary rounds.    ICU admission day: 2    Overnight events:   No acute events overnight     Assessments and best practice:  Ventilator       N/a  Glycemic control  Diet  ADULT DIET; Regular  Stress ulcer prophylaxis.  not indicated  DVT prophylaxis.  SQH  Need for Lines, crain assessed.  Yes  Restraint Reevaluation   Not needed    Disposition regarding transferring out of the ICU  RED      Family contact/MPOA:   Family updated     Palliative consult within 3 days of admission to ICU-  Ethics Guidance: 21 days      Daily Plans:          NIKOLAY time 11 minutes        Lizet Montesinos RN  05/14/24  Pulmonary, Critical Care Medicine  [facility]

## 2024-05-14 NOTE — PLAN OF CARE
Problem: Safety - Adult  Goal: Free from fall injury  Outcome: Progressing     Problem: Pain  Goal: Verbalizes/displays adequate comfort level or baseline comfort level  Outcome: Progressing   Patient hypertensive on cardene gtt, goal to keep sbp 130s-150s. P.O antihypertensive given. Patient impulsive, jumped out of bed this morning to go to the restroom, pulled out his IV, IV replaced.

## 2024-05-14 NOTE — INTERDISCIPLINARY ROUNDS
[facility]  Pulmonary, Critical Care, and Sleep Medicine  Interdisciplinary and Ventilator Weaning Rounds    Patient discussed in morning walking rounds and interdisciplinary rounds.    ICU admission day: 2    Overnight events:   No acute events overnight     Assessments and best practice:  Ventilator       N/a  Glycemic control  Diet  ADULT DIET; Regular  Stress ulcer prophylaxis.  not indicated  DVT prophylaxis.  SQH  Need for Lines, crain assessed.  Yes  Restraint Reevaluation   Not needed    Disposition regarding transferring out of the ICU  RED      Family contact/MPOA:   Family updated     Palliative consult within 3 days of admission to ICU-  Ethics Guidance: 21 days      Daily Plans:  Increase hydralazine   Replace Mag         NIKOLAY time 11 minutes        Lizet Montesinos RN  05/14/24  Pulmonary, Critical Care Medicine  [facility]

## 2024-05-14 NOTE — CASE COMMUNICATION
COMMUNICATION NOTE - Neurology Service    Name:  Jason Joaquin     MRN: [id number]         Communication Note:   Now awake and alert   MRI brain-Multifocal signal abnormalities in the setting of hypertensive emergency are  most concerning for PRES including involvement of the brainstem and cerebellum  with petechial hemorrhage in the medulla.    Strict BP control  SBP goal <140 mm hg.   Continue Keppra 500 mg BID   I explained in detail about the current condition.   Rest of the management per primary team.  Neurology will sign off.   Please do not hesitate to call with questions or concerns.  Please let us know if we can provide any additional information.

## 2024-05-14 NOTE — PROGRESS NOTES
attended the interdisciplinary rounds for Jason Joaquin, who is a 43 y.o.,male. Patient's Primary Language is: English.   According to the patient's EMR Alevism Affiliation is: None.     The reason the Patient came to the hospital is:   Patient Active Problem List    Diagnosis Date Noted    Polysubstance abuse (HCC) 05/13/2024    Seizure (HCC) 05/13/2024    Hypertensive emergency 05/12/2024          Plan:   participated and listen to the recommendations of the IDR team.    Chaplains will continue to follow and will provide pastoral care on an as needed/requested basis.     recommends bedside caregivers page  on duty if patient shows signs of acute spiritual or emotional distress.     Roverto Morales  Staff   Spiritual Health   [phone]

## 2024-05-14 NOTE — PROGRESS NOTES
Comprehensive Nutrition Assessment    Type and Reason for Visit:  Initial, Positive Nutrition Screen    Nutrition Recommendations/Plan:   Continue regular diet as tolerated.  Order Ensure Plus High Protein (provides 350 kcal, 20g protein) TID.  Order MVI/min due to malnutrition.   Continue to monitor tolerance of PO, compliance of oral supplements, weight, labs, and plan of care during admission.         Malnutrition Assessment:  Malnutrition Status:  Moderate malnutrition (05/14/24 0945)    Context:  Social/Environmental Circumstances     Findings of the 6 clinical characteristics of malnutrition:  Energy Intake:  No significant decrease in energy intake  Weight Loss:  No significant weight loss     Body Fat Loss:  Mild body fat loss Triceps, Fat Overlying Ribs, Buccal region   Muscle Mass Loss:  Mild muscle mass loss Temples (temporalis), Clavicles (pectoralis & deltoids), Calf (gastrocnemius) (moderate)  Fluid Accumulation:  Unable to assess     Strength:  Not Performed    Nutrition History and Allergies:   PMHx: HTN, polysubstance abuse. Pt reports  lb, normal, denies any wt changes. Bed scale taken this date 128 lb. Current charted wt 150 lb (5/13/24) - no source. Reports good intake/appetite PTA, no decrease in appetite. NFPE conducted. NKFA.    Nutrition Assessment:    Admitted for syncope, hypertension on arrival, witnessed seizure along with persistent encephalopathy. UDS positive for opiates and cocaine. Positive nutrition screen noted, MST: unsure. Pt states eating well in-house, consuming majority of meals. Agreeable to try Ensure. Will add oral supplements to increase calorie/protein intake opportunity. Discussed care during interdisciplinary rounds. Per RN, pt tolerating diet. Plan to replace lytes.    Nutrition Related Findings:    Last BM (including prior to admit): 05/14/24  Pertinent Meds: keppra, spironolactone Pertinent Labs: POC Glucose  mg/dl x 24 hrs, Na 134 L, K 3.4 L, BUN/Cr  Dockweiler, MS, RD, [facility]  Contact: [phone]

## 2024-05-14 NOTE — CARE COORDINATION
Attempt to complete initial cm assessment unsuccessful r/t pt's condition. Will attempt again at later time.     Leonora Beyer, DENZELN, RN-CM  [facility]

## 2024-05-15 PROBLEM — I50.20 HFREF (HEART FAILURE WITH REDUCED EJECTION FRACTION) (HCC): Status: ACTIVE | Noted: 2024-05-15

## 2024-05-15 PROBLEM — I67.83 PRES (POSTERIOR REVERSIBLE ENCEPHALOPATHY SYNDROME): Status: ACTIVE | Noted: 2024-05-15

## 2024-05-15 PROBLEM — E87.6 HYPOKALEMIA: Status: ACTIVE | Noted: 2024-05-15

## 2024-05-15 PROBLEM — N17.9 ACUTE KIDNEY INJURY (HCC): Status: ACTIVE | Noted: 2024-05-15

## 2024-05-15 LAB
ANION GAP SERPL CALC-SCNC: 6 MMOL/L (ref 3–18)
BASOPHILS # BLD: 0 K/UL (ref 0–0.1)
BASOPHILS NFR BLD: 0 % (ref 0–2)
BUN SERPL-MCNC: 28 MG/DL (ref 7–18)
BUN/CREAT SERPL: 16 (ref 12–20)
CALCIUM SERPL-MCNC: 9 MG/DL (ref 8.5–10.1)
CHLORIDE SERPL-SCNC: 105 MMOL/L (ref 100–111)
CO2 SERPL-SCNC: 23 MMOL/L (ref 21–32)
CREAT SERPL-MCNC: 1.71 MG/DL (ref 0.6–1.3)
DIFFERENTIAL METHOD BLD: ABNORMAL
EOSINOPHIL # BLD: 0 K/UL (ref 0–0.4)
EOSINOPHIL NFR BLD: 0 % (ref 0–5)
ERYTHROCYTE [DISTWIDTH] IN BLOOD BY AUTOMATED COUNT: 13.5 % (ref 11.6–14.5)
GLUCOSE BLD STRIP.AUTO-MCNC: 103 MG/DL (ref 70–110)
GLUCOSE BLD STRIP.AUTO-MCNC: 103 MG/DL (ref 70–110)
GLUCOSE BLD STRIP.AUTO-MCNC: 117 MG/DL (ref 70–110)
GLUCOSE BLD STRIP.AUTO-MCNC: 97 MG/DL (ref 70–110)
GLUCOSE SERPL-MCNC: 122 MG/DL (ref 74–99)
HCT VFR BLD AUTO: 42.8 % (ref 36–48)
HGB BLD-MCNC: 14.6 G/DL (ref 13–16)
IMM GRANULOCYTES # BLD AUTO: 0.1 K/UL (ref 0–0.04)
IMM GRANULOCYTES NFR BLD AUTO: 1 % (ref 0–0.5)
LYMPHOCYTES # BLD: 1.4 K/UL (ref 0.9–3.6)
LYMPHOCYTES NFR BLD: 12 % (ref 21–52)
MAGNESIUM SERPL-MCNC: 2 MG/DL (ref 1.6–2.6)
MCH RBC QN AUTO: 33 PG (ref 24–34)
MCHC RBC AUTO-ENTMCNC: 34.1 G/DL (ref 31–37)
MCV RBC AUTO: 96.8 FL (ref 78–100)
MONOCYTES # BLD: 0.9 K/UL (ref 0.05–1.2)
MONOCYTES NFR BLD: 8 % (ref 3–10)
NEUTS SEG # BLD: 8.7 K/UL (ref 1.8–8)
NEUTS SEG NFR BLD: 79 % (ref 40–73)
NRBC # BLD: 0 K/UL (ref 0–0.01)
NRBC BLD-RTO: 0 PER 100 WBC
PHOSPHATE SERPL-MCNC: 3.1 MG/DL (ref 2.5–4.9)
PLATELET # BLD AUTO: 193 K/UL (ref 135–420)
PMV BLD AUTO: 9.5 FL (ref 9.2–11.8)
POTASSIUM SERPL-SCNC: 3.6 MMOL/L (ref 3.5–5.5)
RBC # BLD AUTO: 4.42 M/UL (ref 4.35–5.65)
SODIUM SERPL-SCNC: 134 MMOL/L (ref 136–145)
T4 FREE SERPL-MCNC: 1.1 NG/DL (ref 0.7–1.5)
TSH SERPL DL<=0.05 MIU/L-ACNC: 0.88 UIU/ML (ref 0.36–3.74)
WBC # BLD AUTO: 11.1 K/UL (ref 4.6–13.2)

## 2024-05-15 PROCEDURE — 80048 BASIC METABOLIC PNL TOTAL CA: CPT

## 2024-05-15 PROCEDURE — 99291 CRITICAL CARE FIRST HOUR: CPT | Performed by: INTERNAL MEDICINE

## 2024-05-15 PROCEDURE — 2000000000 HC ICU R&B

## 2024-05-15 PROCEDURE — 6360000002 HC RX W HCPCS: Performed by: PHYSICIAN ASSISTANT

## 2024-05-15 PROCEDURE — APPSS15 APP SPLIT SHARED TIME 0-15 MINUTES: Performed by: NURSE PRACTITIONER

## 2024-05-15 PROCEDURE — 6370000000 HC RX 637 (ALT 250 FOR IP): Performed by: STUDENT IN AN ORGANIZED HEALTH CARE EDUCATION/TRAINING PROGRAM

## 2024-05-15 PROCEDURE — 94761 N-INVAS EAR/PLS OXIMETRY MLT: CPT

## 2024-05-15 PROCEDURE — 84100 ASSAY OF PHOSPHORUS: CPT

## 2024-05-15 PROCEDURE — 6370000000 HC RX 637 (ALT 250 FOR IP): Performed by: PHYSICIAN ASSISTANT

## 2024-05-15 PROCEDURE — 6370000000 HC RX 637 (ALT 250 FOR IP): Performed by: INTERNAL MEDICINE

## 2024-05-15 PROCEDURE — 83735 ASSAY OF MAGNESIUM: CPT

## 2024-05-15 PROCEDURE — 36415 COLL VENOUS BLD VENIPUNCTURE: CPT

## 2024-05-15 PROCEDURE — 84443 ASSAY THYROID STIM HORMONE: CPT

## 2024-05-15 PROCEDURE — 6370000000 HC RX 637 (ALT 250 FOR IP): Performed by: NURSE PRACTITIONER

## 2024-05-15 PROCEDURE — 85025 COMPLETE CBC W/AUTO DIFF WBC: CPT

## 2024-05-15 PROCEDURE — 99223 1ST HOSP IP/OBS HIGH 75: CPT | Performed by: INTERNAL MEDICINE

## 2024-05-15 PROCEDURE — 99233 SBSQ HOSP IP/OBS HIGH 50: CPT | Performed by: STUDENT IN AN ORGANIZED HEALTH CARE EDUCATION/TRAINING PROGRAM

## 2024-05-15 PROCEDURE — 84439 ASSAY OF FREE THYROXINE: CPT

## 2024-05-15 PROCEDURE — 82962 GLUCOSE BLOOD TEST: CPT

## 2024-05-15 RX ORDER — CARVEDILOL 3.12 MG/1
3.12 TABLET ORAL
Status: COMPLETED | OUTPATIENT
Start: 2024-05-15 | End: 2024-05-15

## 2024-05-15 RX ORDER — CARVEDILOL 3.12 MG/1
3.12 TABLET ORAL ONCE
Status: DISCONTINUED | OUTPATIENT
Start: 2024-05-15 | End: 2024-05-15

## 2024-05-15 RX ORDER — CARVEDILOL 12.5 MG/1
12.5 TABLET ORAL 2 TIMES DAILY WITH MEALS
Status: DISCONTINUED | OUTPATIENT
Start: 2024-05-15 | End: 2024-05-17 | Stop reason: HOSPADM

## 2024-05-15 RX ORDER — CARVEDILOL 3.12 MG/1
3.12 TABLET ORAL 2 TIMES DAILY WITH MEALS
Status: DISCONTINUED | OUTPATIENT
Start: 2024-05-15 | End: 2024-05-15

## 2024-05-15 RX ORDER — LORAZEPAM 2 MG/ML
2 INJECTION INTRAMUSCULAR EVERY 6 HOURS PRN
Status: DISCONTINUED | OUTPATIENT
Start: 2024-05-15 | End: 2024-05-17 | Stop reason: HOSPADM

## 2024-05-15 RX ORDER — CARVEDILOL 6.25 MG/1
6.25 TABLET ORAL 2 TIMES DAILY WITH MEALS
Status: DISCONTINUED | OUTPATIENT
Start: 2024-05-15 | End: 2024-05-15

## 2024-05-15 RX ADMIN — AMLODIPINE BESYLATE 10 MG: 10 TABLET ORAL at 08:38

## 2024-05-15 RX ADMIN — CARVEDILOL 3.12 MG: 3.12 TABLET, FILM COATED ORAL at 10:19

## 2024-05-15 RX ADMIN — HYDRALAZINE HYDROCHLORIDE 100 MG: 50 TABLET ORAL at 13:40

## 2024-05-15 RX ADMIN — SPIRONOLACTONE 25 MG: 25 TABLET ORAL at 08:38

## 2024-05-15 RX ADMIN — THERA TABS 1 TABLET: TAB at 08:38

## 2024-05-15 RX ADMIN — HYDRALAZINE HYDROCHLORIDE 100 MG: 50 TABLET ORAL at 05:14

## 2024-05-15 RX ADMIN — LEVETIRACETAM 500 MG: 100 INJECTION INTRAVENOUS at 05:55

## 2024-05-15 RX ADMIN — CARVEDILOL 3.12 MG: 3.12 TABLET, FILM COATED ORAL at 10:13

## 2024-05-15 RX ADMIN — CARVEDILOL 12.5 MG: 12.5 TABLET, FILM COATED ORAL at 17:17

## 2024-05-15 RX ADMIN — LEVETIRACETAM 500 MG: 100 INJECTION INTRAVENOUS at 17:23

## 2024-05-15 RX ADMIN — HYDRALAZINE HYDROCHLORIDE 100 MG: 50 TABLET ORAL at 20:11

## 2024-05-15 ASSESSMENT — PAIN SCALES - GENERAL
PAINLEVEL_OUTOF10: 0

## 2024-05-15 NOTE — PROGRESS NOTES
attended the interdisciplinary rounds for Jason Joaquin, who is a 43 y.o.,male. Patient's Primary Language is: English.   According to the patient's EMR Caodaism Affiliation is: None.     The reason the Patient came to the hospital is:   Patient Active Problem List    Diagnosis Date Noted    Polysubstance abuse (HCC) 05/13/2024    Seizure (HCC) 05/13/2024    Hypertensive emergency 05/12/2024          Plan:   participated and listen to the recommendations of the IDR team.    Discussion about counseling.  Patient alert and eating. Patient may be going to stepdown on today 5-15-24.    Chaplains will continue to follow and will provide pastoral care on an as needed/requested basis.     recommends bedside caregivers page  on duty if patient shows signs of acute spiritual or emotional distress.    Chaplain Roverto Morales  Staff   Spiritual Health   [phone]

## 2024-05-15 NOTE — CARE COORDINATION
05/15/24 1354   /Social Work Whiteboard Notes   /Social Work Whiteboard RED: 5/14/24 Pt not medically stable to transition to next level of care. Disposition TBD. dpj-cm

## 2024-05-15 NOTE — CARE COORDINATION
05/15/24 9622   Service Assessment   Patient Orientation Unable to Assess   Cognition Other (see comment)  (unaable to assess)   History Provided By Child/Family  (Alina Melton, aunt [phone])   Primary Caregiver Self   Accompanied By/Relationship self   Support Systems Family Members;Friends/Neighbors   Patient's Healthcare Decision Maker is: Legal Next of Kin   PCP Verified by CM Yes   Last Visit to PCP   (n/a)   Prior Functional Level Independent in ADLs/IADLs   Current Functional Level Independent in ADLs/IADLs   Can patient return to prior living arrangement Yes   Ability to make needs known: Fair   Family able to assist with home care needs: No   Would you like for me to discuss the discharge plan with any other family members/significant others, and if so, who?   (unable to assess)   Financial Resources Medicaid   Community Resources None   CM/SW Referral   (n/a)   Social/Functional History   Lives With   (unknown)   Type of Home   (unknown)   Home Layout   (unknown)   Home Access   (unknown)   Bathroom Shower/Tub   (unknown)   Bathroom Toilet   (unknown)   Bathroom Equipment   (unknown)   Bathroom Accessibility   (unknown)   Home Equipment   (unknown)   Receives Help From Friend(s);Family  (limited)   ADL Assistance Independent   Homemaking Assistance   (unknown)   Homemaking Responsibilities   (unknown)   Ambulation Assistance Independent   Transfer Assistance Independent   Active  No   Patient's  Info possibly friends  (unknown)   Mode of Transportation Car   Education   (n/a)   Occupation   (unknown)   Discharge Planning   Type of Residence   (unknown)   Living Arrangements Alone   Current Services Prior To Admission None   Potential Assistance Needed N/A   DME Ordered? No   Potential Assistance Purchasing Medications No   Type of Home Care Services None   Patient expects to be discharged to: Unknown   Follow Up Appointment: Best Day/Time    (TBD)   One/Two Story Residence   (unknown)    History of falls?   (unknown)   Services At/After Discharge   Transition of Care Consult (CM Consult) N/A   Services At/After Discharge   (TBD)    Resource Information Provided?   (n/a)   Mode of Transport at Discharge Other (see comment)  (cab.ride share)   Hospital Transport Time of Discharge   (TBD)   Confirm Follow Up Transport   (n/a)   Condition of Participation: Discharge Planning   The Plan for Transition of Care is related to the following treatment goals: Discharge plan TBT   The Patient and/or Patient Representative was provided with a Choice of Provider?   (n/a)   Freedom of Choice list was provided with basic dialogue that supports the patient's individualized plan of care/goals, treatment preferences, and shares the quality data associated with the providers?  No   Documentation for Discharge Appeal   Discharge Appealed by   (n/a)

## 2024-05-15 NOTE — PROGRESS NOTES
[facility]  Progress Note    Patient: Jason Joaquin Age: 43 y.o. : 1980 MR#: [id number] SSN: [id number]  Date: 5/15/2024                 DVT Prophylaxis:  []Lovenox  []Hep SQ  []SCDs  []Coumadin   []On Heparin gtt []PO anticoagulant    Anticipated discharge: 1-2 days, provided blood pressure is at goal, and renal function continues to improve    Subjective:     The patient is new to me today.  He was seen and examined at the bedside in follow-up for hypertensive emergency, hypertensive encephalopathy, acute kidney injury, HFrEF and seizures, and other issues.  Patient was resting comfortably and was in no acute distress.  He said that he feels weak but he denied any headache, chest pain, shortness of breath, nausea or vomiting.  Patient said that he has been able to get out of bed and walk to the bathroom, but feels somewhat weak.  His blood pressure is better controlled but it is not at goal.  Patient said that clonidine had worked for him in the past.        Objective:   VS: BP (!) 154/107   Pulse (!) 107   Temp 99 °F (37.2 °C) (Oral)   Resp 26   Ht 1.829 m (6')   Wt 58.2 kg (128 lb 4.9 oz)   SpO2 99%   BMI 17.40 kg/m²    Tmax/24hrs: No data recorded.    Intake/Output Summary (Last 24 hours) at 5/15/2024 1525  Last data filed at 5/15/2024 0800  Gross per 24 hour   Intake 125 ml   Output 2100 ml   Net -1975 ml        PHYSICAL EXAM  General Appearance: Looks poorly nourished  HENT: normocephalic/atraumatic, moist mucus membranes  Neck: No JVD, supple  Lungs: CTA with normal respiratory effort  CV: Tachycardic but rhythm regular;  no m/r/g  Abdomen: soft, non-tender, normal bowel sounds  Extremities: no cyanosis, no peripheral edema  Neuro: No focal deficits, motor/sensory intact  Skin: Normal color, intact  Psych: appropriate affect, alert and oriented to person, place and time    Current Facility-Administered Medications   Medication Dose Route  for him in the past but there are concerns regarding the patient's compliance with medications.  If he stops taking clonidine, he could experience rebound hypertension.    Cardiology input appreciated- discussed with Dr. Hay    2.  Hypertensive encephalopathy  Resolved; MRI of the brain showed features of posterior reversible encephalopathy syndrome  Minimize use of psychotropic/neurotropic medications    3.  Heart failure with mildly reduced ejection fraction  2D echo showed an EF of 45 to 50%.  Cardiomyopathy could be nonischemic, and secondary to substance abuse.  Continue patient on carvedilol.  Avoid ACEI/ARNI/ARB at this time because of acute kidney injury  Monitor intake and output    4.  Acute kidney injury  Could be prerenal and secondary to accelerated hypertension.  Renal function has steadily improved, however.  Continue management of underlying etiology  Monitor intake and output  Avoid use of nephrotoxins including NSAIDs and contrast.    5.  Seizure  Could have been caused by hypertensive emergency  MRI of the brain was negative for any acute infarction but showed features of PRES  Continue patient on Keppra.  Ativan as needed has been ordered for breakthrough seizures.  Maintain seizure precautions  Neurology input appreciated    7.  Polysubstance abuse  UDS was positive for cocaine and opiates.  Patient has been counseled about substance abuse and cessation.    Please contact Dr. Arnold if there are any questions. Greater than 60 minutes were spent reviewing the patient's chart, obtaining a thorough history, performing a physical exam, placing orders and dictating this document.  Findings and plan were also discussed with the patient/patient's family and with RN.  Greater than 50% of the time was spent on direct patient care.               Signed By: [unfilled]     May 15, 2024 3:25 PM

## 2024-05-15 NOTE — CARE COORDINATION
05/15/24 1357   /Social Work Whiteboard Notes   /Social Work Whiteboard RED: 5/15/24 Pt not medically stable to transition to next level of care. Disposition TBD. dpj-cm

## 2024-05-15 NOTE — INTERDISCIPLINARY ROUNDS
[facility]  Pulmonary, Critical Care, and Sleep Medicine  Interdisciplinary and Ventilator Weaning Rounds    Patient discussed in morning walking rounds and interdisciplinary rounds.    ICU admission day: 5/12/24     Overnight events:   Remained on Cardene gtt overnight. Hydralazine increased     Assessments and best practice:  Ventilator       N/a  Glycemic control  Diet  ADULT DIET; Regular  ADULT ORAL NUTRITION SUPPLEMENT; Breakfast, Lunch, Dinner; Standard High Calorie/High Protein Oral Supplement  Stress ulcer prophylaxis.  not indicated  DVT prophylaxis.  SQH  Need for Lines, crain assessed.  Yes  Restraint Reevaluation   Not needed    Disposition regarding transferring out of the ICU  YELLOW       Family contact/MPOA: patient alert and oriented   Family updated     Palliative consult within 3 days of admission to ICU-  Ethics Guidance: 21 days      Daily Plans:  Cardene gtt d/c   Hydralazine increased to 100 mg Q8h   Consult cardiology for CHFrEF and hypertension   Start Coreg, low dose         NIKOLAY time 5 minutes        YAMILEX Navarrete - NP  05/15/24  Pulmonary, Critical Care Medicine  [facility]

## 2024-05-15 NOTE — CONSULTS
[facility] - Consult Note    Cardiology consultation request from Dr. Alecia Vicente, NP for evaluation and management/treatment of cardiomyopathy    Date of  Admission: 5/12/2024  1:14 PM   Primary Care Physician:  No primary care provider on file.    Attending Cardiologist: Dr. Kameron Hay       Assessment:     -HTN emergency with AMS s/p illicit drug use, UDS 5/12/2024 positive for cocaine and opiates  -Sinus tachycardia  -Cardiomyopathy, mild, Echo 5/13/2024 with LVEF 45-50%, grade I diastolic dysfunction, RV normal size and systolic function, no significant valvular disease  -OLIVA, improving, Cr 2.49 on presentation 5/12/2024    No Primary cardiologist; consult by Dr. Kameron Hay     Plan:       I saw, evaluated, interviewed and examined the patient personally.  Patient with history of hypertension however noncompliant with the medication  Has not been taking any blood pressure medication for almost 2-3 months.  Also smoking and also doing cocaine  Currently no complaints.  Hemodynamically stable.  Blood pressure elevated however on downward trend.  No rales.  No JVD, no abdominal distention or tenderness.  Abdomen is soft.  No lower extremity edema  Echo with 45% EF  EKG without any ST change ischemia    Recommendation:  -Hypertensive emergency with noncompliance and ongoing tobacco and smoking abuse  Mild cardiomyopathy, likely hypertensive in nature  Currently no angina or decompensated heart failure  Agree with carvedilol, amlodipine, hydralazine Aldactone.  Would avoid clonidine which she was taking as outpatient to avoid any rebound hypertension in a patient with noncompliance  Will continue hydralazine for now and consider changing to ARB or Entresto as outpatient once creatinine improved  Will follow    Significant time spent in reviewing the case, multiple EMR databases, physician notes, reviewing pertinent labs and imaging studies  I spent significant amount of time for medical decision

## 2024-05-15 NOTE — PROGRESS NOTES
[facility].  Pulmonary, Critical Care, and Sleep Medicine    Name: Jason Joaquin MRN: [id number]   : 1980 Hospital: [facility]   Date: 5/15/2024  Admission Date: 2024     Chart and notes reviewed. Data reviewed. I have evaluated all findings.    [x]I have reviewed the flowsheet and previous day’s notes.    []The patient is unable to give any meaningful history or review of systems because the patient is:  []Intubated []Sedated   []Unresponsive      []The patient is critically ill on      []Mechanical ventilation []Pressors   []BiPAP []         Interval HPI:  Patient is a 43 y.o. male w/ pmhx of HTN and polysubstance abuse who presented to the ED complaining of a syncopal episode. He reported to the ED that he used heroin today but that it looked different, after which he passed out. He was noted to be profoundly hypertensive on arrival, with SBPs >200. He was given IV anti-hypertensives during workup. Work up significant for elevated Cr from baseline and mild hyponatremia. During work up, he had what appeared to be a stroke w/ associated tonic clonic activity, after which he was altered. He was started on Keppra and Neurology following. He was sent for CT head for concern for PRES which was negative for any acute process. He was started on nicardipine gtt due to refractory hypertension and worsening symptoms. PO antihypertensives have been incorporated and now weaning down on Cardene gtt. MRI  with findings of PRES, however, patient now awake and alert. Cardene gtt d/c 5/15/24. Cardiology consulted for CHFrEF and ongoing hypertension.        Subjective 05/15/24  Hospital Day: 24   Vent Day: N/A   Overnight events:Remained on Cardene gtt overnight. Required Hydralazine dosage increase   Mentation/Activity: A&O x4   Respiratory/ Secretions: stable on room air   Hemodynamics: tachycardic, SBP reasonable, DBP elevated   Urine output, bowel:  see I&O   Diet:  04:40 AM    CO2 23 05/15/2024 04:40 AM    BUN 28 05/15/2024 04:40 AM    CREATININE 1.71 05/15/2024 04:40 AM    GLUCOSE 122 05/15/2024 04:40 AM    CALCIUM 9.0 05/15/2024 04:40 AM       Lab Results   Component Value Date    ALT 22 05/12/2024    AST 21 05/12/2024    ALKPHOS 69 05/12/2024    BILITOT 0.6 05/12/2024      No results found for: \"DDIMER\"   No results found for: \"TSHFT4\", \"TSH\", \"CBD1MTM\"   Hemoglobin A1C   Date Value Ref Range Status   05/12/2024 5.1 4.2 - 5.6 % Final     Comment:     (NOTE)  HbA1C Interpretive Ranges  <5.7              Normal  5.7 - 6.4         Consider Prediabetes  >6.5              Consider Diabetes        No results found for: \"APTT\"   No results found for: \"INR\", \"PROTIME\"   Results       ** No results found for the last 336 hours. **                 Imaging:  [x]   I have personally reviewed the patient’s radiographs and reports  Most recent imaging:  MRI BRAIN WO CONTRAST    Result Date: 5/13/2024  Multifocal signal abnormalities in the setting of hypertensive emergency are most concerning for PRES including involvement of the brainstem and cerebellum with petechial hemorrhage in the medulla. Acute findings were communicated to LUIS Arnold at 2:48 p.m. 5/13/2024.    XR CHEST PORTABLE    Result Date: 5/13/2024  No acute radiographic findings.     CT HEAD WO CONTRAST    Result Date: 5/12/2024  1.   No acute intracranial process identified.      05/12/24    VAS ANKLE BRACHIAL INDEX (KAM) 05/13/2024  9:45 PM (Final)    Interpretation Summary    Study was technically difficult due to: patient uncooperative.    No evidence of arterial disease of the lower extremities bilaterally, at rest.    Small vessel disease bilaterally.    Right KAM is 1.12, TBI is 0.58.  Left KAM is 1.10, TBI is 0.59.    Signed by: Shorty Flores MD on 5/13/2024  9:45 PM     No valid procedures specified.     IMPRESSION:   Hypertensive emergency with PRES - in the setting of crack/cocaine, s/p cardene gtt.

## 2024-05-16 PROBLEM — R55 TRANSIENT LOSS OF CONSCIOUSNESS: Status: ACTIVE | Noted: 2024-05-16

## 2024-05-16 PROBLEM — I42.9 CARDIOMYOPATHY (HCC): Status: ACTIVE | Noted: 2024-05-16

## 2024-05-16 PROBLEM — R00.0 SINUS TACHYCARDIA: Status: ACTIVE | Noted: 2024-05-16

## 2024-05-16 PROBLEM — I21.4 NSTEMI (NON-ST ELEVATED MYOCARDIAL INFARCTION) (HCC): Status: ACTIVE | Noted: 2024-05-16

## 2024-05-16 PROBLEM — I10 HYPERTENSION: Status: ACTIVE | Noted: 2024-05-16

## 2024-05-16 LAB
ANION GAP SERPL CALC-SCNC: 7 MMOL/L (ref 3–18)
BASOPHILS # BLD: 0 K/UL (ref 0–0.1)
BASOPHILS NFR BLD: 0 % (ref 0–2)
BUN SERPL-MCNC: 39 MG/DL (ref 7–18)
BUN/CREAT SERPL: 23 (ref 12–20)
CALCIUM SERPL-MCNC: 8.7 MG/DL (ref 8.5–10.1)
CHLORIDE SERPL-SCNC: 102 MMOL/L (ref 100–111)
CO2 SERPL-SCNC: 23 MMOL/L (ref 21–32)
CREAT SERPL-MCNC: 1.7 MG/DL (ref 0.6–1.3)
DIFFERENTIAL METHOD BLD: ABNORMAL
EOSINOPHIL # BLD: 0.1 K/UL (ref 0–0.4)
EOSINOPHIL NFR BLD: 1 % (ref 0–5)
ERYTHROCYTE [DISTWIDTH] IN BLOOD BY AUTOMATED COUNT: 13.4 % (ref 11.6–14.5)
GLUCOSE BLD STRIP.AUTO-MCNC: 107 MG/DL (ref 70–110)
GLUCOSE BLD STRIP.AUTO-MCNC: 107 MG/DL (ref 70–110)
GLUCOSE BLD STRIP.AUTO-MCNC: 126 MG/DL (ref 70–110)
GLUCOSE BLD STRIP.AUTO-MCNC: 92 MG/DL (ref 70–110)
GLUCOSE SERPL-MCNC: 110 MG/DL (ref 74–99)
HCT VFR BLD AUTO: 41.2 % (ref 36–48)
HGB BLD-MCNC: 14.1 G/DL (ref 13–16)
IMM GRANULOCYTES # BLD AUTO: 0 K/UL (ref 0–0.04)
IMM GRANULOCYTES NFR BLD AUTO: 0 % (ref 0–0.5)
LYMPHOCYTES # BLD: 1.4 K/UL (ref 0.9–3.6)
LYMPHOCYTES NFR BLD: 16 % (ref 21–52)
MAGNESIUM SERPL-MCNC: 1.9 MG/DL (ref 1.6–2.6)
MCH RBC QN AUTO: 33.7 PG (ref 24–34)
MCHC RBC AUTO-ENTMCNC: 34.2 G/DL (ref 31–37)
MCV RBC AUTO: 98.3 FL (ref 78–100)
MONOCYTES # BLD: 1 K/UL (ref 0.05–1.2)
MONOCYTES NFR BLD: 11 % (ref 3–10)
NEUTS SEG # BLD: 6.6 K/UL (ref 1.8–8)
NEUTS SEG NFR BLD: 72 % (ref 40–73)
NRBC # BLD: 0 K/UL (ref 0–0.01)
NRBC BLD-RTO: 0 PER 100 WBC
PHOSPHATE SERPL-MCNC: 3.4 MG/DL (ref 2.5–4.9)
PLATELET # BLD AUTO: 181 K/UL (ref 135–420)
PMV BLD AUTO: 9.6 FL (ref 9.2–11.8)
POTASSIUM SERPL-SCNC: 4.1 MMOL/L (ref 3.5–5.5)
RBC # BLD AUTO: 4.19 M/UL (ref 4.35–5.65)
SODIUM SERPL-SCNC: 132 MMOL/L (ref 136–145)
WBC # BLD AUTO: 9.2 K/UL (ref 4.6–13.2)

## 2024-05-16 PROCEDURE — 36415 COLL VENOUS BLD VENIPUNCTURE: CPT

## 2024-05-16 PROCEDURE — 6370000000 HC RX 637 (ALT 250 FOR IP): Performed by: STUDENT IN AN ORGANIZED HEALTH CARE EDUCATION/TRAINING PROGRAM

## 2024-05-16 PROCEDURE — 6370000000 HC RX 637 (ALT 250 FOR IP): Performed by: INTERNAL MEDICINE

## 2024-05-16 PROCEDURE — 99232 SBSQ HOSP IP/OBS MODERATE 35: CPT | Performed by: INTERNAL MEDICINE

## 2024-05-16 PROCEDURE — 94761 N-INVAS EAR/PLS OXIMETRY MLT: CPT

## 2024-05-16 PROCEDURE — 85025 COMPLETE CBC W/AUTO DIFF WBC: CPT

## 2024-05-16 PROCEDURE — 97165 OT EVAL LOW COMPLEX 30 MIN: CPT

## 2024-05-16 PROCEDURE — 6360000002 HC RX W HCPCS: Performed by: PHYSICIAN ASSISTANT

## 2024-05-16 PROCEDURE — 82962 GLUCOSE BLOOD TEST: CPT

## 2024-05-16 PROCEDURE — 80048 BASIC METABOLIC PNL TOTAL CA: CPT

## 2024-05-16 PROCEDURE — 84100 ASSAY OF PHOSPHORUS: CPT

## 2024-05-16 PROCEDURE — 97166 OT EVAL MOD COMPLEX 45 MIN: CPT

## 2024-05-16 PROCEDURE — 97162 PT EVAL MOD COMPLEX 30 MIN: CPT

## 2024-05-16 PROCEDURE — 99232 SBSQ HOSP IP/OBS MODERATE 35: CPT | Performed by: HOSPITALIST

## 2024-05-16 PROCEDURE — 97535 SELF CARE MNGMENT TRAINING: CPT

## 2024-05-16 PROCEDURE — 6370000000 HC RX 637 (ALT 250 FOR IP): Performed by: PHYSICIAN ASSISTANT

## 2024-05-16 PROCEDURE — 2140000001 HC CVICU INTERMEDIATE R&B

## 2024-05-16 PROCEDURE — 97530 THERAPEUTIC ACTIVITIES: CPT

## 2024-05-16 PROCEDURE — 83735 ASSAY OF MAGNESIUM: CPT

## 2024-05-16 RX ORDER — CLONIDINE HYDROCHLORIDE 0.1 MG/1
0.2 TABLET ORAL 2 TIMES DAILY
Status: DISCONTINUED | OUTPATIENT
Start: 2024-05-16 | End: 2024-05-17 | Stop reason: HOSPADM

## 2024-05-16 RX ORDER — HYDRALAZINE HYDROCHLORIDE 50 MG/1
100 TABLET, FILM COATED ORAL EVERY 8 HOURS SCHEDULED
Status: DISCONTINUED | OUTPATIENT
Start: 2024-05-16 | End: 2024-05-17 | Stop reason: HOSPADM

## 2024-05-16 RX ADMIN — OXYCODONE AND ACETAMINOPHEN 1 TABLET: 7.5; 325 TABLET ORAL at 16:04

## 2024-05-16 RX ADMIN — SPIRONOLACTONE 25 MG: 25 TABLET ORAL at 08:09

## 2024-05-16 RX ADMIN — LEVETIRACETAM 500 MG: 100 INJECTION INTRAVENOUS at 16:56

## 2024-05-16 RX ADMIN — LEVETIRACETAM 500 MG: 100 INJECTION INTRAVENOUS at 06:03

## 2024-05-16 RX ADMIN — AMLODIPINE BESYLATE 10 MG: 10 TABLET ORAL at 08:09

## 2024-05-16 RX ADMIN — CARVEDILOL 12.5 MG: 12.5 TABLET, FILM COATED ORAL at 16:04

## 2024-05-16 RX ADMIN — HYDRALAZINE HYDROCHLORIDE 100 MG: 50 TABLET ORAL at 21:32

## 2024-05-16 RX ADMIN — HYDRALAZINE HYDROCHLORIDE 100 MG: 50 TABLET ORAL at 04:09

## 2024-05-16 RX ADMIN — THERA TABS 1 TABLET: TAB at 08:09

## 2024-05-16 RX ADMIN — OXYCODONE AND ACETAMINOPHEN 1 TABLET: 7.5; 325 TABLET ORAL at 21:33

## 2024-05-16 RX ADMIN — CLONIDINE HYDROCHLORIDE 0.2 MG: 0.1 TABLET ORAL at 19:28

## 2024-05-16 RX ADMIN — HYDRALAZINE HYDROCHLORIDE 100 MG: 50 TABLET ORAL at 13:43

## 2024-05-16 RX ADMIN — CARVEDILOL 12.5 MG: 12.5 TABLET, FILM COATED ORAL at 08:09

## 2024-05-16 RX ADMIN — OXYCODONE AND ACETAMINOPHEN 1 TABLET: 7.5; 325 TABLET ORAL at 09:51

## 2024-05-16 ASSESSMENT — PAIN SCALES - GENERAL
PAINLEVEL_OUTOF10: 10
PAINLEVEL_OUTOF10: 5
PAINLEVEL_OUTOF10: 0
PAINLEVEL_OUTOF10: 8
PAINLEVEL_OUTOF10: 0
PAINLEVEL_OUTOF10: 10
PAINLEVEL_OUTOF10: 4
PAINLEVEL_OUTOF10: 6
PAINLEVEL_OUTOF10: 0

## 2024-05-16 ASSESSMENT — PAIN DESCRIPTION - ONSET
ONSET: SUDDEN

## 2024-05-16 ASSESSMENT — PAIN DESCRIPTION - ORIENTATION
ORIENTATION: LEFT
ORIENTATION: MID
ORIENTATION: MID
ORIENTATION: LEFT
ORIENTATION: POSTERIOR

## 2024-05-16 ASSESSMENT — PAIN DESCRIPTION - DESCRIPTORS
DESCRIPTORS: ACHING

## 2024-05-16 ASSESSMENT — PAIN DESCRIPTION - LOCATION
LOCATION: BACK
LOCATION: LEG
LOCATION: BACK
LOCATION: BACK;ABDOMEN
LOCATION: LEG

## 2024-05-16 ASSESSMENT — PAIN DESCRIPTION - FREQUENCY
FREQUENCY: INTERMITTENT

## 2024-05-16 ASSESSMENT — PAIN DESCRIPTION - DIRECTION
RADIATING_TOWARDS: NO
RADIATING_TOWARDS: NO

## 2024-05-16 ASSESSMENT — PAIN - FUNCTIONAL ASSESSMENT
PAIN_FUNCTIONAL_ASSESSMENT: ACTIVITIES ARE NOT PREVENTED

## 2024-05-16 ASSESSMENT — PAIN DESCRIPTION - PAIN TYPE
TYPE: ACUTE PAIN

## 2024-05-16 NOTE — PROGRESS NOTES
[facility]  Progress Note    Patient: Jason Joaquin Age: 43 y.o. : 1980 MR#: [id number] SSN: [id number]  Date/Time: 2024     Subjective: Patient lying in the bed, feels tired and weak.  Denies any chest pain chest tightness, no headache or blurring of vision.  Patient states he ran out of his medicines 3 months    Did not take any hypertensive medications before arrival to the hospital.      Assessment/Plan:   Hypertensive emergency with PRES - in the setting of crack/cocaine, s/p cardene gtt. Weaned off 5/15    PRES-  involvement of the brainstem and cerebellum with petechial hemorrhage in the medulla, mentation improved   Acute encephalopathy - likely postictal, resolved  Polysubstance abuse  Lactic acidosis, resolved   Seizure  OLIVA on CKD 3  CHFrEF- Echo , EF 45-50%     Plan  Blood pressure trending better, continue amlodipine, Coreg, hydralazine and Aldactone  Continue SSI and monitor blood sugar  Mental status significantly better, back to baseline  Continue Keppra  PT/OT eval and treatment  Out of bed to chair  Discussed with RN to walk him in the hallway.      Dispo plan: Home with home health care once medically stable, anticipated discharge date 2024      Case discussed with:  [x]Patient  []Family  [x]Nursing  []Case Management  DVT Prophylaxis:  []Lovenox  []Hep SQ  [x]SCDs  []Coumadin   []Eliquis/Xarelto     Objective:   VS: BP (!) 145/97   Pulse (!) 116   Temp 97.1 °F (36.2 °C) (Oral)   Resp 20   Ht 1.829 m (6')   Wt 58.2 kg (128 lb 4.9 oz)   SpO2 97%   BMI 17.40 kg/m²    Tmax/24hrs: Temp (24hrs), Av.9 °F (36.6 °C), Min:97.1 °F (36.2 °C), Max:98.6 °F (37 °C)  IOBRIEF  Intake/Output Summary (Last 24 hours) at 2024 1657  Last data filed at 2024 1541  Gross per 24 hour   Intake 720 ml   Output 1500 ml   Net -780 ml       General:  Alert, cooperative, no acute distress    HEENT: PERRLA, anicteric sclerae.  Pulmonary:   Monocytes % 11 (H) 3 - 10 %    Eosinophils % 1 0 - 5 %    Basophils % 0 0 - 2 %    Immature Granulocytes % 0 0.0 - 0.5 %    Neutrophils Absolute 6.6 1.8 - 8.0 K/UL    Lymphocytes Absolute 1.4 0.9 - 3.6 K/UL    Monocytes Absolute 1.0 0.05 - 1.2 K/UL    Eosinophils Absolute 0.1 0.0 - 0.4 K/UL    Basophils Absolute 0.0 0.0 - 0.1 K/UL    Immature Granulocytes Absolute 0.0 0.00 - 0.04 K/UL    Differential Type AUTOMATED     Basic Metabolic Panel    Collection Time: 05/16/24  1:28 AM   Result Value Ref Range    Sodium 132 (L) 136 - 145 mmol/L    Potassium 4.1 3.5 - 5.5 mmol/L    Chloride 102 100 - 111 mmol/L    CO2 23 21 - 32 mmol/L    Anion Gap 7 3.0 - 18 mmol/L    Glucose 110 (H) 74 - 99 mg/dL    BUN 39 (H) 7.0 - 18 MG/DL    Creatinine 1.70 (H) 0.6 - 1.3 MG/DL    BUN/Creatinine Ratio 23 (H) 12 - 20      Est, Glom Filt Rate 50 (L) >60 ml/min/1.73m2    Calcium 8.7 8.5 - 10.1 MG/DL   Magnesium    Collection Time: 05/16/24  1:28 AM   Result Value Ref Range    Magnesium 1.9 1.6 - 2.6 mg/dL   Phosphorus    Collection Time: 05/16/24  1:28 AM   Result Value Ref Range    Phosphorus 3.4 2.5 - 4.9 MG/DL   POCT Glucose    Collection Time: 05/16/24  7:59 AM   Result Value Ref Range    POC Glucose 92 70 - 110 mg/dL   POCT Glucose    Collection Time: 05/16/24 11:50 AM   Result Value Ref Range    POC Glucose 107 70 - 110 mg/dL   POCT Glucose    Collection Time: 05/16/24  4:46 PM   Result Value Ref Range    POC Glucose 107 70 - 110 mg/dL       Signed By: Gustabo Solano MD     May 16, 2024      Disclaimer: Sections of this note are dictated using utilizing voice recognition software.  Minor typographical errors may be present. If questions arise, please do not hesitate to contact me or call our department.

## 2024-05-16 NOTE — CARE COORDINATION
Chart reviewed.  PTOT recommending home health.  Met with pt and discussed above recommendations.  Pt declined home health stating he does not need it.   He stated he lives with his cousin Justin Melton and he will return there when discharged.  He stated his friend will provide transport when discharged.            Ellyn Rinaldi, DENZELN RN  Care Management

## 2024-05-16 NOTE — PROGRESS NOTES
0730 Received patient sleeping quietly in bed, resp ar even and non labored. Patient easily aroused when name is called. Patient denies SOB, or discomfort at this time.    1000 PT in to visit, patient stated he had some (L) leg discomfort after standing in therapy. PRN medication given.    1200 Patient stated condom catheter came off and he would like to use urinal. Patient inc of bowel at this time. ADL care rendered and urinal was given to patient.    1800 Patient assisted to bathroom. No c/o SOB or chest pain.

## 2024-05-16 NOTE — PROGRESS NOTES
4 Am vitals systolic in the 170's. MD on call paged, Chastity. Orders placed to give AM dose of PO Hydralazine now, order implemented. Will cont to monitor. Pt has no chest pain or headache, no change in LOC. Will recheck in 1 hour.     Verbal report given to CVT SD RN,  Wood. Pt being transferred to room 2302. Pt alert, oriented, in no pain, given CHG bath this shift. Has 1 PIV in the Left AC. VSS. Pt in SR in the 90s.

## 2024-05-16 NOTE — PROGRESS NOTES
Cardiology Progress Note    Admit Date: 5/12/2024  Attending Cardiologist: Dr. Flores    IMPRESSION  Transient loss of consciousness, with witnessed seizure, EKG 5/12/2024 sinus tachycardia left atrial enlargement, incomplete right bundle branch block, septal infarct  Hypertensive urgency, with urine drug screen positive for cocaine and opioids  NSTEMI likely type II in context of hypertensive urgency, 83 ng/L, 72 ng/L, 129 ng/L, 143 ng/L  Hypertension - Stage II pressure to normotension  Cardiomyopathy mild 45 to 50% by 2D echo 5/13/2024  Sinus tachycardia    PLAN  Monitor blood pressure, adjust antihypertensive medications as necessary.  Continue Norvasc 10 mg p.o. daily, Coreg 12.5 mg p.o. twice daily, hydralazine 100 mg p.o. every 8 hours, Aldactone 25 mg p.o. daily.  Start clonidine 0.2mg po bid     No Primary Cardiologist, Consult Dr. EVENS Hay    Subjective:     Denies chest pain  Denies shortness of breath  Denies abdominal pain    Objective:      Patient Vitals for the past 8 hrs:   Temp Pulse Resp BP SpO2   05/16/24 0809 -- 97 -- -- --   05/16/24 0741 97.6 °F (36.4 °C) -- 18 (!) 172/115 99 %   05/16/24 0628 97.7 °F (36.5 °C) (!) 102 18 (!) 160/104 --   05/16/24 0349 98.1 °F (36.7 °C) 95 19 (!) 164/113 100 %         Patient Vitals for the past 96 hrs:   Weight   05/14/24 0937 58.2 kg (128 lb 4.9 oz)   05/13/24 0942 68 kg (150 lb)   05/12/24 1338 68 kg (150 lb)           Intake/Output Summary (Last 24 hours) at 5/16/2024 0858  Last data filed at 5/16/2024 0628  Gross per 24 hour   Intake 992.08 ml   Output 2350 ml   Net -1357.92 ml       EXAMINATION:  General:  Alert, cooperative, no distress  Head:  Normocephalic, without obvious abnormality, atraumatic.  Eyes:  Conjunctivae/corneas clear  Lungs:   Clear to auscultation bilaterally, no wheezes, no rales, no rhonchi  Heart:  Increased rate and rhythm, S1, S2 normal, no murmur, click, rub or gallop.  Abdomen:   Soft, non-tender. Bowel sounds normal.    Extremities: Extremities normal, no edema  Skin:  No rashes or lesions visible extremities  Neurologic:  Normal strength, sensation      Visit Vitals  BP (!) 172/115   Pulse 97   Temp 97.6 °F (36.4 °C) (Oral)   Resp 18   Ht 1.829 m (6')   Wt 58.2 kg (128 lb 4.9 oz)   SpO2 99%   BMI 17.40 kg/m²       Data Review:     Labs: Results:       Chemistry Recent Labs     05/14/24 0342 05/14/24  1855 05/15/24  0440 05/16/24  0128   *  --  134* 132*   K 3.4* 3.3* 3.6 4.1     --  105 102   CO2 24  --  23 23   BUN 36*  --  28* 39*   MG 2.0 2.0 2.0 1.9   PHOS 3.1  --  3.1 3.4      CBC w/Diff Recent Labs     05/14/24  0342 05/15/24  0440 05/16/24  0128   WBC 14.0* 11.1 9.2   RBC 4.19* 4.42 4.19*   HGB 14.2 14.6 14.1   HCT 41.7 42.8 41.2    193 181      Cardiac Enzymes @[id number](CPK:*,CK:*,CPKMB:*,CKRMB:*,CKMMB:*,CKMB:*,RCK3:*,CKMBT:*,CKMBPC:*,CKSMB:*,CKNDX:*,CKND1:*,SAMMY:*,TROQR:*,TROPT:*,TROIQ:*,TROIP:*,AUDRA:*,TROPIT:*,TROPT:*,TRPOIT:*,ITNL:*,TNIPOC:*,BNP:*,BNPP:*,PBNP:*)@   Coagulation No results for input(s): \"INR\", \"APTT\" in the last 72 hours.    Invalid input(s): \"PTP\"    Lipid Panel No results found for: \"CHOL\", \"CHOLPOCT\", \"CHLST\", \"CHOLV\", \"681594\", \"HDL\", \"HDLC\", \"LDL\", \"VLDLC\", \"VLDL\"   BNP No results found for: \"BNP\", \"BNPPOC\"   Liver Enzymes No results for input(s): \"TP\" in the last 72 hours.    Invalid input(s): \"ALB\", \"TBIL\", \"AP\", \"SGOT\", \"GPT\", \"DBIL\"   Thyroid Studies No results found for: \"T4\", \"T3RU\", \"TSH\"       Signed By: LISA RONQUILLO MD     May 16, 2024

## 2024-05-16 NOTE — PLAN OF CARE
Problem: Discharge Planning  Goal: Discharge to home or other facility with appropriate resources  Outcome: Progressing  Flowsheets (Taken 5/16/2024 0800)  Discharge to home or other facility with appropriate resources: Identify barriers to discharge with patient and caregiver     Problem: Safety - Adult  Goal: Free from fall injury  Outcome: Progressing     Problem: Skin/Tissue Integrity  Goal: Absence of new skin breakdown  Description: 1.  Monitor for areas of redness and/or skin breakdown  2.  Assess vascular access sites hourly  3.  Every 4-6 hours minimum:  Change oxygen saturation probe site  4.  Every 4-6 hours:  If on nasal continuous positive airway pressure, respiratory therapy assess nares and determine need for appliance change or resting period.  Outcome: Progressing     Problem: ABCDS Injury Assessment  Goal: Absence of physical injury  Outcome: Progressing     Problem: Pain  Goal: Verbalizes/displays adequate comfort level or baseline comfort level  Outcome: Progressing  Flowsheets (Taken 5/16/2024 0800)  Verbalizes/displays adequate comfort level or baseline comfort level: Encourage patient to monitor pain and request assistance     Problem: Nutrition Deficit:  Goal: Optimize nutritional status  Outcome: Progressing     Problem: Physical Therapy - Adult  Goal: By Discharge: Performs mobility at highest level of function for planned discharge setting.  See evaluation for individualized goals.  Description: Physical Therapy Goals  Initiated 5/16/2024 and to be accomplished within 7 day(s)  1.  Patient will move from supine to sit and sit to supine  in bed with independence.    2.  Patient will transfer from bed to chair and chair to bed with independence using the least restrictive device.  3.  Patient will perform sit to stand with independence.  4.  Patient will ambulate with modified independence for 150 feet with the least restrictive device.   5.  Patient will ascend/descend 4 stairs with

## 2024-05-16 NOTE — PLAN OF CARE
Problem: Physical Therapy - Adult  Goal: By Discharge: Performs mobility at highest level of function for planned discharge setting.  See evaluation for individualized goals.  Description: Physical Therapy Goals  Initiated 5/16/2024 and to be accomplished within 7 day(s)  1.  Patient will move from supine to sit and sit to supine  in bed with independence.    2.  Patient will transfer from bed to chair and chair to bed with independence using the least restrictive device.  3.  Patient will perform sit to stand with independence.  4.  Patient will ambulate with modified independence for 150 feet with the least restrictive device.   5.  Patient will ascend/descend 4 stairs with handrail(s) with modified independence.    PLOF: Independent. Lives with cousins. First floor apartment with 4 NIRAV.    Outcome: Progressing   PHYSICAL THERAPY EVALUATION    Patient: Jason Joaquin (43 y.o. male)  Date: 5/16/2024  Primary Diagnosis: Seizure (HCC) [R56.9]  Polysubstance abuse (HCC) [F19.10]  Hypertensive emergency [I16.1]  Precautions: Fall Risk  ASSESSMENT :  Pt received asleep in bed, woke up on arrival and agreeable to PT. /87, HR 97 in supine. Supervision to EOB with overall slowed movement. Reports dizziness in sitting, /91, HR 98. Dynamic standing with supervision for 4 mins while completing pericare and able to self correct. Supervision STS with slow to stand and uncontrolled eccentric sitting onto EOB. Ambulated 15 ft with no AD supervision. Pt returned to bed in supine at end of session.   Educated on need for RN assistance with mobility; verbalized understanding. Call bell in reach.     DEFICITS/IMPAIRMENTS:   Body Structures, Functions, Activity Limitations Requiring Skilled Therapeutic Intervention: Decreased functional mobility ;Decreased strength;Decreased endurance    Patient will benefit from skilled intervention to address the above impairments.  Patient's rehabilitation potential: Therapy Prognosis:  Apartment  Home Layout: One level  Home Access: Stairs to enter without rails  Entrance Stairs - Number of Steps: 4  Bathroom Shower/Tub:  (unknown)  Bathroom Toilet:  (unknown)  Bathroom Equipment:  (unknown)  Bathroom Accessibility:  (unknown)  Home Equipment: None  Receives Help From: Friend(s), Family (limited)  ADL Assistance: Independent  Homemaking Assistance:  (unknown)  Homemaking Responsibilities:  (unknown)  Ambulation Assistance: Independent  Transfer Assistance: Independent  Active : No  Patient's  Info: possibly friends (unknown)  Mode of Transportation: Car  Education:  (n/a)  Occupation:  (unknown)    Critical Behavior:  Orientation  Orientation Level: Oriented X4    Strength (BLE):    Strength: Generally decreased, functional    Range Of Motion (BLE):  AROM: Generally decreased, functional    Functional Mobility:  Bed Mobility:  Bed Mobility Training  Bed Mobility Training: Yes  Supine to Sit: Supervision  Sit to Supine: Supervision  Scooting: Supervision  Transfers:  Transfer Training  Transfer Training: Yes  Sit to Stand: Supervision  Stand to Sit: Supervision  Balance:   Balance  Sitting: Intact  Standing: Intact  Ambulation/Gait Training:  Gait  Gait Training: Yes  Overall Level of Assistance: Supervision  Distance (ft): 15 Feet  Assistive Device: None  Therapeutic Exercises/Neuromuscular Re-education:   Dynamic standing x4 mins  Sit to stand x2    Pain:  Intensity Pre-treatment: 0/10   Intensity Post-treatment: 0/10  Scale: Numeric Rating Scale    Activity Tolerance:   Activity Tolerance: Patient tolerated evaluation without incident    After treatment:   []         Patient left in no apparent distress sitting up in chair  [x]         Patient left in no apparent distress in bed  [x]         Call bell left within reach  [x]         Nursing notified  []         Caregiver present  []         Bed alarm activated  []         Chair alarm activated  []         SCDs

## 2024-05-16 NOTE — PROGRESS NOTES
TRANSFER - IN REPORT:    Verbal report received from ANNETTA Machado on Jason Joaquin  being received from ICU main for routine progression of patient care      Report consisted of patient's Situation, Background, Assessment and   Recommendations(SBAR).     Information from the following report(s) Nurse Handoff Report, Cardiac Rhythm sinus tachy, and Alarm Parameters was reviewed with the receiving nurse.    Opportunity for questions and clarification was provided.      Assessment completed upon patient's arrival to unit and care assumed.      0620: A&O x 4, on room air, denies any pain or discomfort, skin warm and dry, no complains @ this time.    Bedside shift change report given to ANNETTA Vargas (oncoming nurse) by ANNETTA Muir (offgoing nurse). Report included the following information Nurse Handoff Report, Cardiac Rhythm sinus tachy, and Alarm Parameters.

## 2024-05-16 NOTE — PROGRESS NOTES
Bedside and Verbal shift change report given to Anna Marie RN (oncoming nurse) by Alicia RN (offgoing nurse). Report included the following information Nurse Handoff Report, Adult Overview, and Cardiac Rhythm Sinus Rhythm/ Sinus Tach .     2133:  Pain med given for abdominal and back pain.

## 2024-05-16 NOTE — PLAN OF CARE
consideration.     SUBJECTIVE:   Patient stated, “I don't feel good.”    OBJECTIVE DATA SUMMARY:     Past Medical History:   Diagnosis Date    Dental caries     HTN (hypertension)    No past surgical history on file.    Home Situation:   Social/Functional History  Lives With: Family  Type of Home: Apartment  Home Layout: One level  Home Access: Stairs to enter without rails  Entrance Stairs - Number of Steps: 4  Home Equipment: None  Receives Help From: Friend(s), Family (limited)  ADL Assistance: Independent  Ambulation Assistance: Independent  Transfer Assistance: Independent  Active : No  Patient's  Info: possibly friends (unknown)  Mode of Transportation: Car  Education:  (n/a)  Occupation:  (unknown)  []  Right hand dominant   []  Left hand dominant    Cognitive/Behavioral Status:  Orientation  Overall Orientation Status: Within Functional Limits  Orientation Level: Oriented X4  Cognition  Overall Cognitive Status: Exceptions  Arousal/Alertness: Delayed responses to stimuli  Insights: Decreased awareness of deficits  Initiation: Requires cues for some  Sequencing: Does not require cues    Skin: visible skin intact  Edema: none noted    Vision/Perceptual:    Vision  Vision: Within Functional Limits        Coordination: BUE  Coordination: Generally decreased, functional    Balance:     Balance  Sitting: Intact  Standing: Impaired  Standing - Static: Good  Standing - Dynamic: Good    Strength: BUE  Strength: Generally decreased, functional    Tone & Sensation: BUE  Tone: Normal  Sensation: Intact    Range of Motion: BUE  AROM: Generally decreased, functional     Functional Mobility and Transfers for ADLs:  Bed Mobility:     Bed Mobility Training  Bed Mobility Training: Yes  Supine to Sit: Supervision  Sit to Supine: Supervision  Scooting: Supervision  Transfers:   Transfer Training  Transfer Training: Yes  Sit to Stand: Supervision  Stand to Sit: Supervision  Toilet Transfer: Stand-by assistance    ADL

## 2024-05-17 ENCOUNTER — APPOINTMENT (OUTPATIENT)
Facility: HOSPITAL | Age: 44
End: 2024-05-17
Payer: MEDICAID

## 2024-05-17 VITALS
RESPIRATION RATE: 18 BRPM | WEIGHT: 128.31 LBS | BODY MASS INDEX: 17.38 KG/M2 | SYSTOLIC BLOOD PRESSURE: 129 MMHG | HEART RATE: 95 BPM | DIASTOLIC BLOOD PRESSURE: 80 MMHG | HEIGHT: 72 IN | TEMPERATURE: 97 F | OXYGEN SATURATION: 99 %

## 2024-05-17 PROBLEM — R55 TRANSIENT LOSS OF CONSCIOUSNESS: Status: RESOLVED | Noted: 2024-05-16 | Resolved: 2024-05-17

## 2024-05-17 PROBLEM — E87.6 HYPOKALEMIA: Status: RESOLVED | Noted: 2024-05-15 | Resolved: 2024-05-17

## 2024-05-17 LAB
ANION GAP SERPL CALC-SCNC: 6 MMOL/L (ref 3–18)
BASOPHILS # BLD: 0 K/UL (ref 0–0.1)
BASOPHILS NFR BLD: 0 % (ref 0–2)
BUN SERPL-MCNC: 42 MG/DL (ref 7–18)
BUN/CREAT SERPL: 22 (ref 12–20)
CALCIUM SERPL-MCNC: 8.5 MG/DL (ref 8.5–10.1)
CHLORIDE SERPL-SCNC: 101 MMOL/L (ref 100–111)
CO2 SERPL-SCNC: 23 MMOL/L (ref 21–32)
CREAT SERPL-MCNC: 1.91 MG/DL (ref 0.6–1.3)
DIFFERENTIAL METHOD BLD: ABNORMAL
EOSINOPHIL # BLD: 0.3 K/UL (ref 0–0.4)
EOSINOPHIL NFR BLD: 3 % (ref 0–5)
ERYTHROCYTE [DISTWIDTH] IN BLOOD BY AUTOMATED COUNT: 13.2 % (ref 11.6–14.5)
GLUCOSE BLD STRIP.AUTO-MCNC: 81 MG/DL (ref 70–110)
GLUCOSE BLD STRIP.AUTO-MCNC: 85 MG/DL (ref 70–110)
GLUCOSE SERPL-MCNC: 152 MG/DL (ref 74–99)
HCT VFR BLD AUTO: 38 % (ref 36–48)
HGB BLD-MCNC: 12.8 G/DL (ref 13–16)
IMM GRANULOCYTES # BLD AUTO: 0.1 K/UL (ref 0–0.04)
IMM GRANULOCYTES NFR BLD AUTO: 1 % (ref 0–0.5)
LYMPHOCYTES # BLD: 1.7 K/UL (ref 0.9–3.6)
LYMPHOCYTES NFR BLD: 19 % (ref 21–52)
MAGNESIUM SERPL-MCNC: 2 MG/DL (ref 1.6–2.6)
MCH RBC QN AUTO: 32.7 PG (ref 24–34)
MCHC RBC AUTO-ENTMCNC: 33.7 G/DL (ref 31–37)
MCV RBC AUTO: 97.2 FL (ref 78–100)
MONOCYTES # BLD: 0.8 K/UL (ref 0.05–1.2)
MONOCYTES NFR BLD: 8 % (ref 3–10)
NEUTS SEG # BLD: 6.2 K/UL (ref 1.8–8)
NEUTS SEG NFR BLD: 69 % (ref 40–73)
NRBC # BLD: 0 K/UL (ref 0–0.01)
NRBC BLD-RTO: 0 PER 100 WBC
PHOSPHATE SERPL-MCNC: 3.5 MG/DL (ref 2.5–4.9)
PLATELET # BLD AUTO: 192 K/UL (ref 135–420)
PMV BLD AUTO: 10.1 FL (ref 9.2–11.8)
POTASSIUM SERPL-SCNC: 3.9 MMOL/L (ref 3.5–5.5)
RBC # BLD AUTO: 3.91 M/UL (ref 4.35–5.65)
SODIUM SERPL-SCNC: 130 MMOL/L (ref 136–145)
WBC # BLD AUTO: 9.1 K/UL (ref 4.6–13.2)

## 2024-05-17 PROCEDURE — 6370000000 HC RX 637 (ALT 250 FOR IP): Performed by: INTERNAL MEDICINE

## 2024-05-17 PROCEDURE — 6370000000 HC RX 637 (ALT 250 FOR IP): Performed by: STUDENT IN AN ORGANIZED HEALTH CARE EDUCATION/TRAINING PROGRAM

## 2024-05-17 PROCEDURE — 99232 SBSQ HOSP IP/OBS MODERATE 35: CPT | Performed by: INTERNAL MEDICINE

## 2024-05-17 PROCEDURE — 6360000002 HC RX W HCPCS: Performed by: PHYSICIAN ASSISTANT

## 2024-05-17 PROCEDURE — 85025 COMPLETE CBC W/AUTO DIFF WBC: CPT

## 2024-05-17 PROCEDURE — 83735 ASSAY OF MAGNESIUM: CPT

## 2024-05-17 PROCEDURE — 36415 COLL VENOUS BLD VENIPUNCTURE: CPT

## 2024-05-17 PROCEDURE — 84100 ASSAY OF PHOSPHORUS: CPT

## 2024-05-17 PROCEDURE — 6370000000 HC RX 637 (ALT 250 FOR IP): Performed by: PHYSICIAN ASSISTANT

## 2024-05-17 PROCEDURE — 82962 GLUCOSE BLOOD TEST: CPT

## 2024-05-17 PROCEDURE — 80048 BASIC METABOLIC PNL TOTAL CA: CPT

## 2024-05-17 PROCEDURE — 99239 HOSP IP/OBS DSCHRG MGMT >30: CPT | Performed by: HOSPITALIST

## 2024-05-17 PROCEDURE — 76770 US EXAM ABDO BACK WALL COMP: CPT

## 2024-05-17 RX ORDER — LEVETIRACETAM 500 MG/1
500 TABLET ORAL 2 TIMES DAILY
Qty: 60 TABLET | Refills: 3 | Status: SHIPPED | OUTPATIENT
Start: 2024-05-17

## 2024-05-17 RX ORDER — SPIRONOLACTONE 25 MG/1
12.5 TABLET ORAL DAILY
Qty: 30 TABLET | Refills: 3 | Status: SHIPPED | OUTPATIENT
Start: 2024-05-18

## 2024-05-17 RX ORDER — MULTIVITAMIN WITH IRON
1 TABLET ORAL DAILY
Qty: 30 TABLET | Refills: 0 | Status: SHIPPED | OUTPATIENT
Start: 2024-05-18 | End: 2024-06-17

## 2024-05-17 RX ORDER — CLONIDINE HYDROCHLORIDE 0.2 MG/1
0.2 TABLET ORAL 2 TIMES DAILY
Qty: 60 TABLET | Refills: 3 | Status: SHIPPED | OUTPATIENT
Start: 2024-05-17

## 2024-05-17 RX ORDER — HYDRALAZINE HYDROCHLORIDE 100 MG/1
100 TABLET, FILM COATED ORAL EVERY 8 HOURS SCHEDULED
Qty: 90 TABLET | Refills: 3 | Status: SHIPPED | OUTPATIENT
Start: 2024-05-17

## 2024-05-17 RX ORDER — CARVEDILOL 12.5 MG/1
12.5 TABLET ORAL 2 TIMES DAILY WITH MEALS
Qty: 60 TABLET | Refills: 3 | Status: SHIPPED | OUTPATIENT
Start: 2024-05-17

## 2024-05-17 RX ORDER — SPIRONOLACTONE 25 MG/1
12.5 TABLET ORAL DAILY
Status: DISCONTINUED | OUTPATIENT
Start: 2024-05-18 | End: 2024-05-17 | Stop reason: HOSPADM

## 2024-05-17 RX ORDER — LEVETIRACETAM 500 MG/1
500 TABLET ORAL 2 TIMES DAILY
Status: DISCONTINUED | OUTPATIENT
Start: 2024-05-17 | End: 2024-05-17 | Stop reason: HOSPADM

## 2024-05-17 RX ORDER — AMLODIPINE BESYLATE 10 MG/1
10 TABLET ORAL DAILY
Qty: 30 TABLET | Refills: 2 | Status: SHIPPED | OUTPATIENT
Start: 2024-05-18 | End: 2024-06-17

## 2024-05-17 RX ADMIN — THERA TABS 1 TABLET: TAB at 07:59

## 2024-05-17 RX ADMIN — OXYCODONE AND ACETAMINOPHEN 1 TABLET: 7.5; 325 TABLET ORAL at 04:13

## 2024-05-17 RX ADMIN — CARVEDILOL 12.5 MG: 12.5 TABLET, FILM COATED ORAL at 07:59

## 2024-05-17 RX ADMIN — SPIRONOLACTONE 25 MG: 25 TABLET ORAL at 07:59

## 2024-05-17 RX ADMIN — LEVETIRACETAM 500 MG: 100 INJECTION INTRAVENOUS at 06:12

## 2024-05-17 RX ADMIN — CLONIDINE HYDROCHLORIDE 0.2 MG: 0.1 TABLET ORAL at 07:59

## 2024-05-17 RX ADMIN — AMLODIPINE BESYLATE 10 MG: 10 TABLET ORAL at 07:59

## 2024-05-17 RX ADMIN — HYDRALAZINE HYDROCHLORIDE 100 MG: 50 TABLET ORAL at 06:12

## 2024-05-17 ASSESSMENT — PAIN SCALES - GENERAL
PAINLEVEL_OUTOF10: 0
PAINLEVEL_OUTOF10: 3
PAINLEVEL_OUTOF10: 3
PAINLEVEL_OUTOF10: 8
PAINLEVEL_OUTOF10: 9
PAINLEVEL_OUTOF10: 9

## 2024-05-17 ASSESSMENT — PAIN DESCRIPTION - LOCATION
LOCATION: BACK
LOCATION: BACK

## 2024-05-17 ASSESSMENT — PAIN DESCRIPTION - ORIENTATION
ORIENTATION: POSTERIOR
ORIENTATION: POSTERIOR

## 2024-05-17 NOTE — CARE COORDINATION
Discharge order noted for today.  Orders reviewed.  No needs identified at this time.  Met with pt.   Discussed home health with him again and he declined.  He stated a friend will pick him up.            DENZEL SouzaN RN  Care Management

## 2024-05-17 NOTE — PROGRESS NOTES
-Patient called up to the unit to ask if someone can bring his medications down to him at the entrance. His medications were provided to him along with his discharge papers.  -Review of discharge papers declined by patient.

## 2024-05-17 NOTE — PROGRESS NOTES
-Patient's contact called back and informed me that she has not received any response from him, but will let him know to call us if she does.  -I called the patient's contact number on file again and left a message for him to call us back. Callback number provided.

## 2024-05-17 NOTE — PLAN OF CARE
Problem: Nutrition Deficit:  Goal: Optimize nutritional status  Outcome: Progressing     Problem: Pain  Goal: Verbalizes/displays adequate comfort level or baseline comfort level  Outcome: Progressing     Problem: ABCDS Injury Assessment  Goal: Absence of physical injury  Outcome: Progressing     Problem: Skin/Tissue Integrity  Goal: Absence of new skin breakdown  Description: 1.  Monitor for areas of redness and/or skin breakdown  2.  Assess vascular access sites hourly  3.  Every 4-6 hours minimum:  Change oxygen saturation probe site  4.  Every 4-6 hours:  If on nasal continuous positive airway pressure, respiratory therapy assess nares and determine need for appliance change or resting period.  Outcome: Progressing     Problem: Safety - Adult  Goal: Free from fall injury  Outcome: Progressing     Problem: Discharge Planning  Goal: Discharge to home or other facility with appropriate resources  Outcome: Progressing

## 2024-05-17 NOTE — PROGRESS NOTES
[facility] - Progress Note    Admit Date: 5/12/2024  Attending Cardiologist: Dr. Caldera    Assessment:     -HTN emergency with AMS s/p illicit drug use, UDS 5/12/2024 positive for cocaine and opiates  -Sinus tachycardia  -Cardiomyopathy, mild, Echo 5/13/2024 with LVEF 45-50%, grade I diastolic dysfunction, RV normal size and systolic function, no significant valvular disease  -OLIVA, improving, Cr 2.49 on presentation 5/12/2024     No Primary cardiologist; consult by Dr. Kameron Hay    Plan:     -BP stable, continued on Amlodipine, Coreg, Clonidine, Hydralazine.  Will decrease Aldactone to 12.5 mg daily given decrease of GFR to 44 today.  Continue to monitor BP closely and adjust HTN medications as needed; BP improving.  -Increase mobility.    Subjective:     No new complaints.     Objective:      Patient Vitals for the past 8 hrs:   Temp Pulse Resp BP SpO2   05/17/24 0717 96.8 °F (36 °C) 93 18 135/83 98 %   05/17/24 0612 -- -- -- 121/76 --   05/17/24 0443 -- -- 20 -- --   05/17/24 0413 -- -- 18 -- --   05/17/24 0400 97 °F (36.1 °C) 97 20 (!) 143/94 98 %         Patient Vitals for the past 96 hrs:   Weight   05/14/24 0937 58.2 kg (128 lb 4.9 oz)   05/13/24 0942 68 kg (150 lb)       TELE: normal sinus rhythm               Current Facility-Administered Medications   Medication Dose Route Frequency    hydrALAZINE (APRESOLINE) tablet 100 mg  100 mg Oral 3 times per day    cloNIDine (CATAPRES) tablet 0.2 mg  0.2 mg Oral BID    LORazepam (ATIVAN) injection 2 mg  2 mg IntraVENous Q6H PRN    carvedilol (COREG) tablet 12.5 mg  12.5 mg Oral BID WC    spironolactone (ALDACTONE) tablet 25 mg  25 mg Oral Daily    multivitamin 1 tablet  1 tablet Oral Daily    oxyCODONE-acetaminophen (PERCOCET) 7.5-325 MG per tablet 1 tablet  1 tablet Oral Q4H PRN    amLODIPine (NORVASC) tablet 10 mg  10 mg Oral Daily    levETIRAcetam (KEPPRA) injection 500 mg  500 mg IntraVENous Q12H    glucose chewable tablet 16 g  4 tablet Oral PRN

## 2024-05-17 NOTE — DISCHARGE SUMMARY
Discharge Summary    Patient: Jason Joaquin MRN: [id number]  [facility]: [id number]    YOB: 1980  Age: 43 y.o.  Sex: male    DOA: 5/12/2024 LOS:  LOS: 5 days        Disposition: Home with home health care    Discharge Date: 5/17/2024    Admission Diagnosis: Seizure (HCC) [R56.9]  Polysubstance abuse (HCC) [F19.10]  Hypertensive emergency [I16.1]    Discharge Diagnosis:    Hypertensive emergency with PRES   PRES-  involvement of the brainstem and cerebellum with petechial hemorrhage in the medulla, mentation improved   Acute encephalopathy - likely postictal, resolved  Polysubstance abuse  Lactic acidosis, resolved   Seizure  OLIVA on CKD 3  CHFrEF- Echo 5/13, EF 45-50%     Discharge Condition: Stable      PHYSICAL EXAM  Visit Vitals  /83   Pulse 93   Temp 96.8 °F (36 °C) (Oral)   Resp 18   Ht 1.829 m (6')   Wt 58.2 kg (128 lb 4.9 oz)   SpO2 98%   BMI 17.40 kg/m²       General: Alert, cooperative, no acute distress    HEENT: PERRLA, EOMI. Anicteric sclerae.  Lungs:  CTA Bilaterally. No Wheezing/Rales.  Heart:             Regular rate and Rhythm.  Abdomen: Soft, Non distended, Non tender. + Bowel sounds.  Extremities: No edema.  Psych:   Good insight. Not anxious or agitated.  Neurologic:  AA, oriented X 3. Moves all ext                                 Hospital Course:    43 y.o. male w/ pmhx of HTN and polysubstance abuse who presented to the ED complaining of a syncopal episode. He reported to the ED that he used heroin today but that it looked different, after which he passed out. He was noted to be profoundly hypertensive on arrival, with SBPs >200. He was given IV anti-hypertensives during workup. Work up significant for elevated Cr from baseline and mild hyponatremia. During work up, he had what appeared to be a stroke w/ associated tonic clonic activity, after which he was altered. He was started on Keppra and Neurology following. He was sent for CT head for concern for PRES which was negative  COREG  Take 1 tablet by mouth 2 times daily (with meals)     cloNIDine 0.2 MG tablet  Commonly known as: CATAPRES  Take 1 tablet by mouth 2 times daily     hydrALAZINE 100 MG tablet  Commonly known as: APRESOLINE  Take 1 tablet by mouth every 8 hours     levETIRAcetam 500 MG tablet  Commonly known as: KEPPRA  Take 1 tablet by mouth 2 times daily     multivitamin Tabs tablet  Take 1 tablet by mouth daily  Start taking on: May 18, 2024            CHANGE how you take these medications      spironolactone 25 MG tablet  Commonly known as: ALDACTONE  Take 0.5 tablets by mouth daily  Start taking on: May 18, 2024  What changed: how much to take            CONTINUE taking these medications      acetaminophen 325 MG tablet  Commonly known as: TYLENOL     oxyCODONE-acetaminophen 5-325 MG per tablet  Commonly known as: PERCOCET            STOP taking these medications      diazePAM 5 MG tablet  Commonly known as: VALIUM     indapamide 2.5 MG tablet  Commonly known as: LOZOL     ketorolac 10 MG tablet  Commonly known as: TORADOL               Where to Get Your Medications        These medications were sent to [facility] - [place], [place] - [address] [place] [address] -  [phone] - F [phone]  [address] [place] [address]      Phone: [phone]   amLODIPine 10 MG tablet  carvedilol 12.5 MG tablet  cloNIDine 0.2 MG tablet  hydrALAZINE 100 MG tablet  levETIRAcetam 500 MG tablet  multivitamin Tabs tablet  spironolactone 25 MG tablet             It is important that you take the medication exactly as they are prescribed.   Keep your medication in the bottles provided by the pharmacist and keep a list of the medication names, dosages, and times to be taken in your wallet.   Do not take other medications without consulting your doctor.       DIET:  cardiac diet    ACTIVITY: activity as tolerated  Home health care for Skilled care for Hypertension and medication management       PT/OT consult      ADDITIONAL

## 2024-05-17 NOTE — PROGRESS NOTES
-Went to the patient's room to review discharge papers and provide medications from pharmacy and the patient is not present in room.  -Called patient's phone number on file 3 times. No answer.  -Called the patient's contact and asked for another contact number for the patient. She informed me she will call me back.  -Patient's PIV was removed by the charge nurse prior to him leaving.  -Charge nurse aware.

## 2024-05-17 NOTE — PROGRESS NOTES
5/17/2024      ERINTA attempted treatment with patient x 3. Patient going for Ultrasound during initial attempt; eating lunch during 2nd attempt; and participated in about 4 minutes of therapeutic exercises before declining ongoing therapy. Attempted to motivate patient to continue with services, however, patient continued to decline reporting that he had to save his energy as he was going home this afternoon.    JESUS MANUEL Kohli

## 2024-07-10 ENCOUNTER — HOSPITAL ENCOUNTER (EMERGENCY)
Facility: HOSPITAL | Age: 44
Discharge: HOME | End: 2024-07-10
Attending: STUDENT IN AN ORGANIZED HEALTH CARE EDUCATION/TRAINING PROGRAM
Payer: MEDICAID

## 2024-07-10 VITALS
SYSTOLIC BLOOD PRESSURE: 144 MMHG | HEIGHT: 72 IN | BODY MASS INDEX: 17.61 KG/M2 | RESPIRATION RATE: 10 BRPM | OXYGEN SATURATION: 97 % | TEMPERATURE: 98.3 F | WEIGHT: 130 LBS | DIASTOLIC BLOOD PRESSURE: 115 MMHG | HEART RATE: 78 BPM

## 2024-07-10 DIAGNOSIS — T40.1X1A ACCIDENTAL OVERDOSE OF HEROIN, INITIAL ENCOUNTER (HCC): Primary | ICD-10-CM

## 2024-07-10 DIAGNOSIS — T40.601A OPIATE OVERDOSE, ACCIDENTAL OR UNINTENTIONAL, INITIAL ENCOUNTER (HCC): ICD-10-CM

## 2024-07-10 PROCEDURE — 99284 EMERGENCY DEPT VISIT MOD MDM: CPT

## 2024-07-10 PROCEDURE — 96374 THER/PROPH/DIAG INJ IV PUSH: CPT

## 2024-07-10 PROCEDURE — 2700000000 HC OXYGEN THERAPY PER DAY

## 2024-07-10 PROCEDURE — 2580000003 HC RX 258: Performed by: HEALTH CARE PROVIDER

## 2024-07-10 PROCEDURE — 6360000002 HC RX W HCPCS: Performed by: HEALTH CARE PROVIDER

## 2024-07-10 PROCEDURE — 94761 N-INVAS EAR/PLS OXIMETRY MLT: CPT

## 2024-07-10 RX ORDER — HYDRALAZINE HYDROCHLORIDE 20 MG/ML
20 INJECTION INTRAMUSCULAR; INTRAVENOUS ONCE
Status: DISCONTINUED | OUTPATIENT
Start: 2024-07-10 | End: 2024-07-11 | Stop reason: HOSPADM

## 2024-07-10 RX ORDER — NALOXONE HYDROCHLORIDE 0.4 MG/ML
0.4 INJECTION, SOLUTION INTRAMUSCULAR; INTRAVENOUS; SUBCUTANEOUS
Status: COMPLETED | OUTPATIENT
Start: 2024-07-10 | End: 2024-07-10

## 2024-07-10 RX ORDER — NALOXONE HYDROCHLORIDE 4 MG/.1ML
1 SPRAY NASAL PRN
Qty: 2 EACH | Refills: 0 | Status: SHIPPED | OUTPATIENT
Start: 2024-07-10

## 2024-07-10 RX ORDER — 0.9 % SODIUM CHLORIDE 0.9 %
1000 INTRAVENOUS SOLUTION INTRAVENOUS ONCE
Status: COMPLETED | OUTPATIENT
Start: 2024-07-10 | End: 2024-07-10

## 2024-07-10 RX ADMIN — SODIUM CHLORIDE 1000 ML: 9 INJECTION, SOLUTION INTRAVENOUS at 17:24

## 2024-07-10 RX ADMIN — NALOXONE HYDROCHLORIDE 0.4 MG: 0.4 INJECTION, SOLUTION INTRAMUSCULAR; INTRAVENOUS; SUBCUTANEOUS at 17:20

## 2024-07-10 ASSESSMENT — ENCOUNTER SYMPTOMS
SORE THROAT: 0
NAUSEA: 1
COLOR CHANGE: 0
ABDOMINAL PAIN: 0
VOMITING: 1
COUGH: 0
SHORTNESS OF BREATH: 0
BACK PAIN: 0
PHOTOPHOBIA: 0

## 2024-07-10 ASSESSMENT — PAIN - FUNCTIONAL ASSESSMENT: PAIN_FUNCTIONAL_ASSESSMENT: NONE - DENIES PAIN

## 2024-07-10 NOTE — ED NOTES
I received the patient turnover from Dr. Xi bautista at the end of his shift.  The patient is a 43-year-old male who presents to the ED today with an opiate overdose.  He received IV Narcan from EMS and got another 0.4 of Narcan IV in our ED. We will observe for 3 hours.    We have observed the patient for more than 3 hours.  He is resting comfortably with a normal respiratory rate and oxygen saturation.  He will be discharged home with a prescription for Narcan and outpatient opiate rehab resources.  Return precautions have been given.

## 2024-07-10 NOTE — ED PROVIDER NOTES
Emergency Department Note    Patient: Jason Joaquin Age: 43 y.o. Sex: male    YOB: 1980 Admit Date: 7/10/2024 PCP: No primary care provider on file.   MRN: [id number]  [facility]: [id number]     Room: [facility]/ Time Dictated: 6:58 PM        Chief Complaint   Chief Complaint   Patient presents with    Drug Overdose       History of Present Illness   Jason Joaquin is a 43 y.o. male with past medical history of polysubstance abuse, HFrEF, hypertension, PRES who presents to the ED via EMS with the chief complaint of overdose.  Patient states he snorted 2 caps of heroin this evening, though does not remember details of this.  Per EMS, patient was found in the front yard of a residence lying on the ground.  The home owner activated EMS.  Upon EMS arrival, patient was responsive to verbal and tactile stimuli, and walked to the medic.  En route, patient had respiratory depression with respiratory rate estimated to be 10 breaths/min, and hypoxemic to the mid 80s.  Patient was given 1 mg Narcan IV, and placed on 2 L supplemental oxygen via nasal cannula.    Review of Systems   Review of Systems   Constitutional:  Negative for chills and fever.   HENT:  Negative for congestion and sore throat.    Eyes:  Negative for photophobia and visual disturbance.   Respiratory:  Negative for cough and shortness of breath.    Cardiovascular:  Negative for chest pain and palpitations.   Gastrointestinal:  Positive for nausea and vomiting. Negative for abdominal pain.   Genitourinary:  Negative for dysuria, frequency and urgency.   Musculoskeletal:  Negative for back pain, neck pain and neck stiffness.   Skin:  Negative for color change, pallor, rash and wound.   Neurological:  Negative for dizziness, syncope, light-headedness, numbness and headaches.          Past Medical/Surgical History     Past Medical History:   Diagnosis Date    Dental caries     HTN (hypertension)      No past surgical history on file.    Social History

## 2024-07-10 NOTE — ED NOTES
PATIENT LEFT LYING IN BED, BREATHING EVIDENT, NO COMPLAINT VOICED. HAND OVER REPORT GIVEN TO RAHUL LITTLEJOHN, CARE CONTINUES.

## 2024-07-10 NOTE — ED TRIAGE NOTES
Pt transported to ED via EMS w/opioid overdose. Found outside passed out and diaphoretic. EMS says pt states he took 2 caps of heroin. EMS reports they administered 1mg of IV Narcan. Pt has a 20g to Left AC- C/D/I dressing. Pt placed on telemetry monitor.

## 2024-07-11 NOTE — DISCHARGE INSTRUCTIONS
[facility] in [facility] Fax: [phone], phone: [phone]    [facility] Fax: [phone], phone: [phone]    [facility] [place]:   [place] - [phone]  [place]- [phone]  [place]- [phone]  [place]- [phone]  [place]- [phone]  [place]-[phone]  [facility]- [phone]  [place]- [phone]    [facility]:  [place]-  Fax: [phone], phone [phone] or [phone]   [place]- [phone]    [facility]:  [phone]  POC: Lucita Almonte [phone]    [facility]:  [phone]  POC: Nisha Morse [phone]    Peer support warm line ([place]):  [phone]  POC: Jayson Ross  [phone]    [facility]:  [phone]  POC: Jarred Martínez [phone]

## 2024-08-02 ENCOUNTER — HOSPITAL ENCOUNTER (EMERGENCY)
Facility: HOSPITAL | Age: 44
Discharge: HOME | End: 2024-08-03
Attending: EMERGENCY MEDICINE
Payer: MEDICAID

## 2024-08-02 DIAGNOSIS — T40.2X1A OPIOID OVERDOSE, ACCIDENTAL OR UNINTENTIONAL, INITIAL ENCOUNTER (HCC): Primary | ICD-10-CM

## 2024-08-02 PROCEDURE — 99283 EMERGENCY DEPT VISIT LOW MDM: CPT

## 2024-08-02 ASSESSMENT — PAIN - FUNCTIONAL ASSESSMENT: PAIN_FUNCTIONAL_ASSESSMENT: NONE - DENIES PAIN

## 2024-08-03 VITALS
SYSTOLIC BLOOD PRESSURE: 150 MMHG | WEIGHT: 129 LBS | OXYGEN SATURATION: 97 % | HEART RATE: 98 BPM | RESPIRATION RATE: 17 BRPM | DIASTOLIC BLOOD PRESSURE: 100 MMHG | HEIGHT: 72 IN | TEMPERATURE: 97.7 F | BODY MASS INDEX: 17.47 KG/M2

## 2024-08-03 ASSESSMENT — PAIN - FUNCTIONAL ASSESSMENT: PAIN_FUNCTIONAL_ASSESSMENT: NONE - DENIES PAIN

## 2024-08-03 NOTE — ED PROVIDER NOTES
EMERGENCY DEPARTMENT HISTORY AND PHYSICAL EXAM    10:39 PM EDT    Date: 8/2/2024  Patient Name: Jason Joaquin    History of Presenting Illness     Chief Complaint   Patient presents with    Addiction Problem       History Provided By: patient    Additional History (Context): Jason Joaquin is a  43-year-old male past medical history opioid use disorder, presenting to the emergency department after being found unconscious unresponsive on the side of the road, responsive to 2 mg intranasal Narcan.  On arrival, patient alert and oriented, able to recall that he ingested heroin. He remembers is being in the ambulance for transport to this emergency department.  Denies any current fever, chills, nausea vomiting diarrhea, chest pain, abdominal pain, palpitations, or other symptoms at this time.  Denies any other coingestions.    PCP: No primary care provider on file.    Chief Complaint: Overdose  Duration: Unknown duration of downtime, awake and alert in emergency department  Timing: Now resolved  Location: [facility]  Quality: Overdose, acting, unresponsive, now resolved  Severity: Severe, now resolved  Modifying Factors: None  Associated Symptoms: None      No current facility-administered medications for this encounter.     Current Outpatient Medications   Medication Sig Dispense Refill    naloxone (NARCAN TO-GO) 2 each by Nasal route once for 1 dose 2 kit 0    levETIRAcetam (KEPPRA) 500 MG tablet Take 1 tablet by mouth 2 times daily 60 tablet 3    cloNIDine (CATAPRES) 0.2 MG tablet Take 1 tablet by mouth 2 times daily 60 tablet 3    hydrALAZINE (APRESOLINE) 100 MG tablet Take 1 tablet by mouth every 8 hours 90 tablet 3    carvedilol (COREG) 12.5 MG tablet Take 1 tablet by mouth 2 times daily (with meals) 60 tablet 3    amLODIPine (NORVASC) 10 MG tablet Take 1 tablet by mouth daily for 30 doses 30 tablet 2    spironolactone (ALDACTONE) 25 MG tablet Take 0.5 tablets by mouth daily 30 tablet 3    Multiple Vitamin

## 2024-08-03 NOTE — DISCHARGE INSTRUCTIONS
Please return to the emergency department develop worsening dizziness, episodes of passing out, chest pain, trouble breathing, fevers, chills or other symptoms that are significant to you.

## 2025-01-16 ENCOUNTER — APPOINTMENT (OUTPATIENT)
Facility: HOSPITAL | Age: 45
End: 2025-01-16
Payer: MEDICAID

## 2025-01-16 ENCOUNTER — HOSPITAL ENCOUNTER (EMERGENCY)
Facility: HOSPITAL | Age: 45
Discharge: ANOTHER HOSPITAL | End: 2025-01-17
Attending: EMERGENCY MEDICINE
Payer: MEDICAID

## 2025-01-16 DIAGNOSIS — R53.1 GENERAL WEAKNESS: ICD-10-CM

## 2025-01-16 DIAGNOSIS — I60.9 SAH (SUBARACHNOID HEMORRHAGE) (HCC): Primary | ICD-10-CM

## 2025-01-16 LAB
ALBUMIN SERPL-MCNC: 4.3 G/DL (ref 3.4–5)
ALBUMIN/GLOB SERPL: 0.8 (ref 0.8–1.7)
ALP SERPL-CCNC: 87 U/L (ref 45–117)
ALT SERPL-CCNC: 38 U/L (ref 16–61)
ANION GAP SERPL CALC-SCNC: 8 MMOL/L (ref 3–18)
AST SERPL-CCNC: 25 U/L (ref 10–38)
BASOPHILS # BLD: 0.08 K/UL (ref 0–0.1)
BASOPHILS NFR BLD: 0.5 % (ref 0–2)
BILIRUB SERPL-MCNC: 0.2 MG/DL (ref 0.2–1)
BUN SERPL-MCNC: 37 MG/DL (ref 7–18)
BUN/CREAT SERPL: 12 (ref 12–20)
CALCIUM SERPL-MCNC: 9.1 MG/DL (ref 8.5–10.1)
CHLORIDE SERPL-SCNC: 110 MMOL/L (ref 100–111)
CK SERPL-CCNC: 232 U/L (ref 39–308)
CO2 SERPL-SCNC: 22 MMOL/L (ref 21–32)
CREAT SERPL-MCNC: 3.1 MG/DL (ref 0.6–1.3)
DIFFERENTIAL METHOD BLD: ABNORMAL
EOSINOPHIL # BLD: 0.11 K/UL (ref 0–0.4)
EOSINOPHIL NFR BLD: 0.7 % (ref 0–5)
ERYTHROCYTE [DISTWIDTH] IN BLOOD BY AUTOMATED COUNT: 12.8 % (ref 11.6–14.5)
ETHANOL SERPL-MCNC: <3 MG/DL (ref 0–3)
GLOBULIN SER CALC-MCNC: 5.1 G/DL (ref 2–4)
GLUCOSE SERPL-MCNC: 182 MG/DL (ref 74–99)
HCT VFR BLD AUTO: 41.5 % (ref 36–48)
HGB BLD-MCNC: 13.4 G/DL (ref 13–16)
IMM GRANULOCYTES # BLD AUTO: 0.14 K/UL (ref 0–0.04)
IMM GRANULOCYTES NFR BLD AUTO: 0.8 % (ref 0–0.5)
LYMPHOCYTES # BLD: 2.64 K/UL (ref 0.9–3.6)
LYMPHOCYTES NFR BLD: 15.8 % (ref 21–52)
MCH RBC QN AUTO: 32.9 PG (ref 24–34)
MCHC RBC AUTO-ENTMCNC: 32.3 G/DL (ref 31–37)
MCV RBC AUTO: 102 FL (ref 78–100)
MONOCYTES # BLD: 0.9 K/UL (ref 0.05–1.2)
MONOCYTES NFR BLD: 5.4 % (ref 3–10)
NEUTS SEG # BLD: 12.89 K/UL (ref 1.8–8)
NEUTS SEG NFR BLD: 76.8 % (ref 40–73)
NRBC # BLD: 0 K/UL (ref 0–0.01)
NRBC BLD-RTO: 0 PER 100 WBC
PLATELET # BLD AUTO: 253 K/UL (ref 135–420)
PMV BLD AUTO: 9.2 FL (ref 9.2–11.8)
POTASSIUM SERPL-SCNC: 4.6 MMOL/L (ref 3.5–5.5)
PROT SERPL-MCNC: 9.4 G/DL (ref 6.4–8.2)
RBC # BLD AUTO: 4.07 M/UL (ref 4.35–5.65)
SODIUM SERPL-SCNC: 140 MMOL/L (ref 136–145)
WBC # BLD AUTO: 16.8 K/UL (ref 4.6–13.2)

## 2025-01-16 PROCEDURE — 71045 X-RAY EXAM CHEST 1 VIEW: CPT

## 2025-01-16 PROCEDURE — 70450 CT HEAD/BRAIN W/O DYE: CPT

## 2025-01-16 PROCEDURE — 83880 ASSAY OF NATRIURETIC PEPTIDE: CPT

## 2025-01-16 PROCEDURE — 85025 COMPLETE CBC W/AUTO DIFF WBC: CPT

## 2025-01-16 PROCEDURE — 80053 COMPREHEN METABOLIC PANEL: CPT

## 2025-01-16 PROCEDURE — 96375 TX/PRO/DX INJ NEW DRUG ADDON: CPT

## 2025-01-16 PROCEDURE — 2580000003 HC RX 258: Performed by: EMERGENCY MEDICINE

## 2025-01-16 PROCEDURE — 82077 ASSAY SPEC XCP UR&BREATH IA: CPT

## 2025-01-16 PROCEDURE — 93005 ELECTROCARDIOGRAM TRACING: CPT | Performed by: EMERGENCY MEDICINE

## 2025-01-16 PROCEDURE — 6360000002 HC RX W HCPCS: Performed by: EMERGENCY MEDICINE

## 2025-01-16 PROCEDURE — 96365 THER/PROPH/DIAG IV INF INIT: CPT

## 2025-01-16 PROCEDURE — 82550 ASSAY OF CK (CPK): CPT

## 2025-01-16 PROCEDURE — 99285 EMERGENCY DEPT VISIT HI MDM: CPT

## 2025-01-16 RX ORDER — LABETALOL HYDROCHLORIDE 5 MG/ML
10 INJECTION, SOLUTION INTRAVENOUS ONCE
Status: COMPLETED | OUTPATIENT
Start: 2025-01-17 | End: 2025-01-16

## 2025-01-16 RX ORDER — NICARDIPINE HYDROCHLORIDE 0.1 MG/ML
2.5-15 INJECTION INTRAVENOUS CONTINUOUS
Status: DISCONTINUED | OUTPATIENT
Start: 2025-01-17 | End: 2025-01-17 | Stop reason: HOSPADM

## 2025-01-16 RX ORDER — METOPROLOL TARTRATE 1 MG/ML
5 INJECTION, SOLUTION INTRAVENOUS
Status: COMPLETED | OUTPATIENT
Start: 2025-01-17 | End: 2025-01-17

## 2025-01-16 RX ORDER — 0.9 % SODIUM CHLORIDE 0.9 %
2000 INTRAVENOUS SOLUTION INTRAVENOUS ONCE
Status: COMPLETED | OUTPATIENT
Start: 2025-01-16 | End: 2025-01-17

## 2025-01-16 RX ADMIN — SODIUM CHLORIDE 2000 ML: 9 INJECTION, SOLUTION INTRAVENOUS at 22:54

## 2025-01-16 RX ADMIN — LABETALOL HYDROCHLORIDE 10 MG: 5 INJECTION, SOLUTION INTRAVENOUS at 23:50

## 2025-01-16 ASSESSMENT — PAIN - FUNCTIONAL ASSESSMENT: PAIN_FUNCTIONAL_ASSESSMENT: NONE - DENIES PAIN

## 2025-01-17 VITALS
WEIGHT: 157.8 LBS | SYSTOLIC BLOOD PRESSURE: 172 MMHG | BODY MASS INDEX: 21.4 KG/M2 | OXYGEN SATURATION: 92 % | HEART RATE: 115 BPM | RESPIRATION RATE: 8 BRPM | TEMPERATURE: 98.8 F | DIASTOLIC BLOOD PRESSURE: 126 MMHG

## 2025-01-17 PROBLEM — R40.4 UNRESPONSIVE EPISODE: Status: ACTIVE | Noted: 2025-01-17

## 2025-01-17 LAB
NT PRO BNP: 2453 PG/ML (ref 0–450)
TROPONIN I SERPL HS-MCNC: 174 NG/L (ref 0–78)

## 2025-01-17 PROCEDURE — 96376 TX/PRO/DX INJ SAME DRUG ADON: CPT

## 2025-01-17 PROCEDURE — 96375 TX/PRO/DX INJ NEW DRUG ADDON: CPT

## 2025-01-17 PROCEDURE — 6360000002 HC RX W HCPCS: Performed by: EMERGENCY MEDICINE

## 2025-01-17 PROCEDURE — 96366 THER/PROPH/DIAG IV INF ADDON: CPT

## 2025-01-17 PROCEDURE — 2500000003 HC RX 250 WO HCPCS: Performed by: EMERGENCY MEDICINE

## 2025-01-17 PROCEDURE — 96374 THER/PROPH/DIAG INJ IV PUSH: CPT

## 2025-01-17 PROCEDURE — 84484 ASSAY OF TROPONIN QUANT: CPT

## 2025-01-17 PROCEDURE — 96365 THER/PROPH/DIAG IV INF INIT: CPT

## 2025-01-17 RX ORDER — LABETALOL HYDROCHLORIDE 5 MG/ML
10 INJECTION, SOLUTION INTRAVENOUS ONCE
Status: COMPLETED | OUTPATIENT
Start: 2025-01-17 | End: 2025-01-17

## 2025-01-17 RX ORDER — METOPROLOL TARTRATE 1 MG/ML
5 INJECTION, SOLUTION INTRAVENOUS
Status: COMPLETED | OUTPATIENT
Start: 2025-01-17 | End: 2025-01-17

## 2025-01-17 RX ADMIN — NICARDIPINE HYDROCHLORIDE 5 MG/HR: 0.1 INJECTION INTRAVENOUS at 00:07

## 2025-01-17 RX ADMIN — METOPROLOL TARTRATE 5 MG: 5 INJECTION INTRAVENOUS at 00:01

## 2025-01-17 RX ADMIN — LABETALOL HYDROCHLORIDE 10 MG: 5 INJECTION, SOLUTION INTRAVENOUS at 01:27

## 2025-01-17 RX ADMIN — METOPROLOL TARTRATE 5 MG: 5 INJECTION INTRAVENOUS at 00:27

## 2025-01-17 NOTE — ED PROVIDER NOTES
[facility] EMERGENCY DEPARTMENT  EMERGENCY DEPARTMENT ENCOUNTER      Pt Name: Jason Joaquin  MRN: [id number]  Birthdate 1980  Date of evaluation: 1/16/2025  Provider: ADENIKE MOTTA MD  12:52 AM    CHIEF COMPLAINT       Chief Complaint   Patient presents with    Drug Overdose         HISTORY OF PRESENT ILLNESS    Jason Joaquin is a 44 y.o. male who presents to the emergency department     44-year-old male multiple medical issues including hypertensive emergency polysubstance abuse seizure disorders congestive heart failure acute kidney injury NSTEMI and cardiomyopathy presents to the emergency department altered x 1-2.  Patient was released from assisted today.  He was going home.  That is all he recalls.  He said he did not take his medications.  He was found in the backyard on the ground shivering.  Police found him after they got a phone call.  Medics brought him in.  He is known to abuse narcotics.  He did not get Narcan.  Fingerstick was not done.  Patient came to the emergency department shivering and could not participate in history initially.    The history is provided by the patient. No  was used.       Nursing Notes were reviewed.    REVIEW OF SYSTEMS       Review of Systems   Unable to perform ROS: Acuity of condition       Except as noted above the remainder of the review of systems was reviewed and negative.       PAST MEDICAL HISTORY     Past Medical History:   Diagnosis Date    Dental caries     HTN (hypertension)          SURGICAL HISTORY     History reviewed. No pertinent surgical history.      CURRENT MEDICATIONS       Previous Medications    ACETAMINOPHEN (TYLENOL) 325 MG TABLET    Take 2 tablets by mouth every 4 hours as needed    AMLODIPINE (NORVASC) 10 MG TABLET    Take 1 tablet by mouth daily for 30 doses    CARVEDILOL (COREG) 12.5 MG TABLET    Take 1 tablet by mouth 2 times daily (with meals)    CLONIDINE (CATAPRES) 0.2 MG TABLET    Take 1 tablet by mouth

## 2025-01-17 NOTE — ED TRIAGE NOTES
Patient presents to the ED via EMS for evaluation of a drug overdose. Patient was found laying outside, in a front lawn. PD was on scene. Patient just got released from [facility] today. When EMS arrived, patient had pinpoint pupils, respiration rate of 10-12, unassisted and A&Ox1-2.

## 2025-01-19 LAB
EKG ATRIAL RATE: 119 BPM
EKG DIAGNOSIS: NORMAL
EKG P AXIS: 55 DEGREES
EKG P-R INTERVAL: 126 MS
EKG Q-T INTERVAL: 320 MS
EKG QRS DURATION: 100 MS
EKG QTC CALCULATION (BAZETT): 450 MS
EKG R AXIS: 2 DEGREES
EKG T AXIS: 53 DEGREES
EKG VENTRICULAR RATE: 119 BPM

## 2025-01-19 PROCEDURE — 93010 ELECTROCARDIOGRAM REPORT: CPT | Performed by: INTERNAL MEDICINE
